# Patient Record
Sex: FEMALE | Race: WHITE | HISPANIC OR LATINO | Employment: FULL TIME | URBAN - METROPOLITAN AREA
[De-identification: names, ages, dates, MRNs, and addresses within clinical notes are randomized per-mention and may not be internally consistent; named-entity substitution may affect disease eponyms.]

---

## 2020-07-27 ENCOUNTER — TELEPHONE (OUTPATIENT)
Dept: SCHEDULING | Facility: IMAGING CENTER | Age: 50
End: 2020-07-27

## 2020-10-18 SDOH — ECONOMIC STABILITY: HOUSING INSECURITY: IN THE LAST 12 MONTHS, HOW MANY PLACES HAVE YOU LIVED?: 2

## 2020-10-18 SDOH — ECONOMIC STABILITY: INCOME INSECURITY: IN THE LAST 12 MONTHS, WAS THERE A TIME WHEN YOU WERE NOT ABLE TO PAY THE MORTGAGE OR RENT ON TIME?: NO

## 2020-10-18 SDOH — HEALTH STABILITY: PHYSICAL HEALTH: ON AVERAGE, HOW MANY DAYS PER WEEK DO YOU ENGAGE IN MODERATE TO STRENUOUS EXERCISE (LIKE A BRISK WALK)?: 3 DAYS

## 2020-10-18 SDOH — ECONOMIC STABILITY: HOUSING INSECURITY
IN THE LAST 12 MONTHS, WAS THERE A TIME WHEN YOU DID NOT HAVE A STEADY PLACE TO SLEEP OR SLEPT IN A SHELTER (INCLUDING NOW)?: NO

## 2020-10-18 SDOH — HEALTH STABILITY: MENTAL HEALTH
STRESS IS WHEN SOMEONE FEELS TENSE, NERVOUS, ANXIOUS, OR CAN'T SLEEP AT NIGHT BECAUSE THEIR MIND IS TROUBLED. HOW STRESSED ARE YOU?: ONLY A LITTLE

## 2020-10-18 SDOH — HEALTH STABILITY: PHYSICAL HEALTH

## 2020-10-18 SDOH — ECONOMIC STABILITY: TRANSPORTATION INSECURITY
IN THE PAST 12 MONTHS, HAS THE LACK OF TRANSPORTATION KEPT YOU FROM MEDICAL APPOINTMENTS OR FROM GETTING MEDICATIONS?: NO

## 2020-10-18 SDOH — ECONOMIC STABILITY: TRANSPORTATION INSECURITY
IN THE PAST 12 MONTHS, HAS LACK OF RELIABLE TRANSPORTATION KEPT YOU FROM MEDICAL APPOINTMENTS, MEETINGS, WORK OR FROM GETTING THINGS NEEDED FOR DAILY LIVING?: NO

## 2020-10-18 ASSESSMENT — SOCIAL DETERMINANTS OF HEALTH (SDOH)
HOW HARD IS IT FOR YOU TO PAY FOR THE VERY BASICS LIKE FOOD, HOUSING, MEDICAL CARE, AND HEATING?: NOT HARD AT ALL
HOW OFTEN DO YOU GET TOGETHER WITH FRIENDS OR RELATIVES?: NEVER
HOW OFTEN DO YOU HAVE SIX OR MORE DRINKS ON ONE OCCASION: NEVER
HOW OFTEN DO YOU ATTENT MEETINGS OF THE CLUB OR ORGANIZATION YOU BELONG TO?: NEVER
WITHIN THE PAST 12 MONTHS, THE FOOD YOU BOUGHT JUST DIDN'T LAST AND YOU DIDN'T HAVE MONEY TO GET MORE: NEVER TRUE
IN A TYPICAL WEEK, HOW MANY TIMES DO YOU TALK ON THE PHONE WITH FAMILY, FRIENDS, OR NEIGHBORS?: TWICE A WEEK
WITHIN THE PAST 12 MONTHS, YOU WORRIED THAT YOUR FOOD WOULD RUN OUT BEFORE YOU GOT THE MONEY TO BUY MORE: NEVER TRUE
DO YOU BELONG TO ANY CLUBS OR ORGANIZATIONS SUCH AS CHURCH GROUPS UNIONS, FRATERNAL OR ATHLETIC GROUPS, OR SCHOOL GROUPS?: NO
HOW OFTEN DO YOU ATTEND CHURCH OR RELIGIOUS SERVICES?: NEVER
HOW OFTEN DO YOU HAVE A DRINK CONTAINING ALCOHOL: 2-4 TIMES A MONTH
HOW MANY DRINKS CONTAINING ALCOHOL DO YOU HAVE ON A TYPICAL DAY WHEN YOU ARE DRINKING: 3 OR 4

## 2020-10-20 ENCOUNTER — OFFICE VISIT (OUTPATIENT)
Dept: MEDICAL GROUP | Facility: PHYSICIAN GROUP | Age: 50
End: 2020-10-20
Payer: COMMERCIAL

## 2020-10-20 VITALS
WEIGHT: 136 LBS | HEART RATE: 85 BPM | HEIGHT: 61 IN | TEMPERATURE: 98.4 F | SYSTOLIC BLOOD PRESSURE: 110 MMHG | OXYGEN SATURATION: 98 % | DIASTOLIC BLOOD PRESSURE: 74 MMHG | BODY MASS INDEX: 25.68 KG/M2 | RESPIRATION RATE: 12 BRPM

## 2020-10-20 DIAGNOSIS — E55.9 VITAMIN D DEFICIENCY: ICD-10-CM

## 2020-10-20 DIAGNOSIS — Z79.890 HORMONE REPLACEMENT THERAPY (HRT): ICD-10-CM

## 2020-10-20 DIAGNOSIS — Z12.11 SCREENING FOR COLON CANCER: ICD-10-CM

## 2020-10-20 DIAGNOSIS — Z00.00 GENERAL MEDICAL EXAM: ICD-10-CM

## 2020-10-20 DIAGNOSIS — N95.1 PERIMENOPAUSAL SYMPTOMS: ICD-10-CM

## 2020-10-20 DIAGNOSIS — Z23 NEED FOR VACCINATION: ICD-10-CM

## 2020-10-20 PROCEDURE — 90471 IMMUNIZATION ADMIN: CPT | Performed by: PHYSICIAN ASSISTANT

## 2020-10-20 PROCEDURE — 90715 TDAP VACCINE 7 YRS/> IM: CPT | Performed by: PHYSICIAN ASSISTANT

## 2020-10-20 PROCEDURE — 99203 OFFICE O/P NEW LOW 30 MIN: CPT | Mod: 25 | Performed by: PHYSICIAN ASSISTANT

## 2020-10-20 RX ORDER — CYANOCOBALAMIN (VITAMIN B-12) 1000 MCG
1 TABLET ORAL DAILY
COMMUNITY
Start: 2017-11-01

## 2020-10-20 ASSESSMENT — PATIENT HEALTH QUESTIONNAIRE - PHQ9: CLINICAL INTERPRETATION OF PHQ2 SCORE: 0

## 2020-10-20 NOTE — PROGRESS NOTES
CC:   Chief Complaint   Patient presents with   • Establish Care   • Medication Refill          HISTORY OF PRESENT ILLNESS: Patient is a 50 y.o. female established patient who presents today to establish care with me and discuss the following issues:      Health Maintenance: Completed  Will print shingrix vaccine.   Tetanus shot today.    Order cologuard. No FH colon cancer.     Hormone replacement therapy (HRT)  Was seeing a hormone doctor in [place]. Is on supplements and testosterone and progesterone therapy.     Started on this for hot flashes, has been on it for about 3 years and has helped her significantly.     Ran out of testosterone 1 1/2 weeks ago, already feeling a difference off it, would like refill.     No FH breast cancer, uterine cancer, blood clot, heart attack or stroke.       Patient Active Problem List    Diagnosis Date Noted   • Hormone replacement therapy (HRT) 10/20/2020      Allergies:Patient has no allergy information on record.    Current Outpatient Medications   Medication Sig Dispense Refill   • Ferrous Sulfate (IRON PO)      • Zoster Vac Recomb Adjuvanted (SHINGRIX) 50 MCG/0.5ML Recon Susp 0.5 mL by Intramuscular route Once for 1 dose. 0.5 mL 0   • diphenhydrAMINE HCl (ALLERGY MED PO) Take 1 Tab by mouth every day.     • Cyanocobalamin (B-12) 1000 MCG Tab Take 1 Tab by mouth every day.     • Cholecalciferol (D3-1000 PO) Take 1 Tab by mouth every day.     • TESTOSTERONE TD Apply 1 Application to affected area(s) every day.     • progesterone (PROMETRIUM) 200 MG capsule Take 1 Cap by mouth every day. 90 Cap 0     No current facility-administered medications for this visit.        Social History     Tobacco Use   • Smoking status: Never Smoker   • Smokeless tobacco: Never Used   Substance Use Topics   • Alcohol use: Yes     Frequency: 2-4 times a month     Drinks per session: 3 or 4     Binge frequency: Never   • Drug use: Never     Social History     Social History Narrative   • Not on file  "      Family History   Problem Relation Age of Onset   • Heart Disease Mother         palpitations   • Diabetes Father        Review of Systems:    Constitutional: No Fevers, Chills  Eyes: No vision changes  ENT: No hearing changes  Resp: No Shortness of breath  CV: No Chest pain  GI: No Nausea/Vomiting  MSK: No weakness  Skin: No rashes  Neuro: No Headaches  Psych: No Suicidal ideations    All remaining systems reviewed and found to be negative, except as stated above.    Exam:    /74   Pulse 85   Temp 36.9 °C (98.4 °F) (Temporal)   Resp 12   Ht 1.549 m (5' 1\")   Wt 61.7 kg (136 lb)   SpO2 98%  Body mass index is 25.7 kg/m².    General:  Well nourished, well developed female in NAD  HENT: Atraumatic, normocephalic  EYES: Extraocular movements intact  NECK: Supple, FROM  CHEST: No deformities, Equal chest expansion  RESP: Unlabored, no stridor or audible wheeze  HEART: Regular Rate and rhythm.   ABD: Soft, Nontender, Non-Distended  Extremities: No Clubbing, Cyanosis, or Edema  Skin: Warm/dry, without rashes  Neuro: A/O x 4, due to COVID-19- did not have patient remove face mask to test cranial nerves.  Motor/sensory grossly intact  Psych: Normal behavior, normal affect      Assessment/Plan:  1. Need for vaccination  - Tdap Vaccine =>6YO IM  - Zoster Vac Recomb Adjuvanted (SHINGRIX) 50 MCG/0.5ML Recon Susp; 0.5 mL by Intramuscular route Once for 1 dose.  Dispense: 0.5 mL; Refill: 0    2. Screening for colon cancer  - COLOGUARD (FIT DNA)    3. Hormone replacement therapy (HRT)  - REFERRAL TO GYNECOLOGY  Patient is on a bioidentical testosterone cream, and progesterone 200 mg daily.  Referring to Dr. Benjamin for hormone replacement therapy.  She needs a refill on her medications at this time, she is unsure of the formulation of the testosterone, she will message me to let me know I did refill progesterone today.  We will send testosterone refill to [facility] pharmacy.  4. General medical exam  - CBC " WITH DIFFERENTIAL; Future  - Comp Metabolic Panel; Future  - Lipid Profile; Future  - TSH; Future    5. Perimenopausal symptoms  - REFERRAL TO GYNECOLOGY    6. Vitamin D deficiency  - VITAMIN D,25 HYDROXY; Future    Other orders  - Ferrous Sulfate (IRON PO)  - diphenhydrAMINE HCl (ALLERGY MED PO); Take 1 Tab by mouth every day.  - Cyanocobalamin (B-12) 1000 MCG Tab; Take 1 Tab by mouth every day.  - Cholecalciferol (D3-1000 PO); Take 1 Tab by mouth every day.  - TESTOSTERONE TD; Apply 1 Application to affected area(s) every day.  - progesterone (PROMETRIUM) 200 MG capsule; Take 1 Cap by mouth every day.  Dispense: 90 Cap; Refill: 0       Follow-up: Return in about 3 months (around 1/20/2021).  Sooner pending labs.    Please note that this dictation was created using voice recognition software. I have made every reasonable attempt to correct obvious errors, but I expect that there are errors of grammar and possibly content that I did not discover before finalizing the note.

## 2020-10-20 NOTE — ASSESSMENT & PLAN NOTE
Was seeing a hormone doctor in [place]. Is on supplements and testosterone and progesterone therapy.     Started on this for hot flashes, has been on it for about 3 years and has helped her significantly.     Ran out of testosterone 1 1/2 weeks ago, already feeling a difference off it, would like refill.     No FH breast cancer, uterine cancer, blood clot, heart attack or stroke.

## 2020-10-21 NOTE — PROGRESS NOTES
Patient is on bioidentical tesosterone: 36mg/gm cream, will call in a refill prescription to [facility] pharmacy.

## 2020-10-22 ENCOUNTER — TELEPHONE (OUTPATIENT)
Dept: MEDICAL GROUP | Facility: PHYSICIAN GROUP | Age: 50
End: 2020-10-22

## 2020-10-22 DIAGNOSIS — N95.1 SYMPTOMATIC MENOPAUSAL OR FEMALE CLIMACTERIC STATES: ICD-10-CM

## 2020-10-22 RX ORDER — TESTOSTERONE MICRONIZED 100 %
POWDER (GRAM) MISCELLANEOUS
Qty: 30 G | Refills: 1 | Status: SHIPPED | OUTPATIENT
Start: 2020-10-22 | End: 2020-11-22

## 2020-10-22 NOTE — TELEPHONE ENCOUNTER
Ying Neely P.A.-C.  You 1 hour ago (8:54 AM)     Need to call [facility] pharmacy and ask how I write the presciption for this: bio identical testosterone 36 mg/gram cream.     Routing comment       Ying Neely P.A.-C. 20 hours ago (1:36 PM)        Patient is on bioidentical tesosterone: 36mg/gm cream, will call in a refill prescription to [facility] pharmacy.

## 2020-11-17 ENCOUNTER — TELEPHONE (OUTPATIENT)
Dept: MEDICAL GROUP | Facility: PHYSICIAN GROUP | Age: 50
End: 2020-11-17

## 2020-11-17 NOTE — TELEPHONE ENCOUNTER
----- Message from Ying Neely P.A.-C. sent at 11/17/2020 12:45 PM PST -----  Please call patient about their results.     Results showed: Your Cologuard test was negative which indicates a more than 99% chance you do not have colon cancer. In light of these results, a colonscopy can be put off for 3 years.    Thank you,    Ying Neely PA-C

## 2020-12-09 ENCOUNTER — HOSPITAL ENCOUNTER (OUTPATIENT)
Dept: LAB | Facility: MEDICAL CENTER | Age: 50
End: 2020-12-09
Attending: PHYSICIAN ASSISTANT
Payer: COMMERCIAL

## 2020-12-09 DIAGNOSIS — Z00.00 GENERAL MEDICAL EXAM: ICD-10-CM

## 2020-12-09 DIAGNOSIS — E55.9 VITAMIN D DEFICIENCY: ICD-10-CM

## 2020-12-09 LAB
ALBUMIN SERPL BCP-MCNC: 4.7 G/DL (ref 3.2–4.9)
ALBUMIN/GLOB SERPL: 1.5 G/DL
ALP SERPL-CCNC: 75 U/L (ref 30–99)
ALT SERPL-CCNC: 20 U/L (ref 2–50)
ANION GAP SERPL CALC-SCNC: 13 MMOL/L (ref 7–16)
AST SERPL-CCNC: 18 U/L (ref 12–45)
BASOPHILS # BLD AUTO: 0.8 % (ref 0–1.8)
BASOPHILS # BLD: 0.06 K/UL (ref 0–0.12)
BILIRUB SERPL-MCNC: 0.4 MG/DL (ref 0.1–1.5)
BUN SERPL-MCNC: 23 MG/DL (ref 8–22)
CALCIUM SERPL-MCNC: 9.8 MG/DL (ref 8.5–10.5)
CHLORIDE SERPL-SCNC: 102 MMOL/L (ref 96–112)
CHOLEST SERPL-MCNC: 248 MG/DL (ref 100–199)
CO2 SERPL-SCNC: 24 MMOL/L (ref 20–33)
CREAT SERPL-MCNC: 0.65 MG/DL (ref 0.5–1.4)
EOSINOPHIL # BLD AUTO: 0.15 K/UL (ref 0–0.51)
EOSINOPHIL NFR BLD: 2 % (ref 0–6.9)
ERYTHROCYTE [DISTWIDTH] IN BLOOD BY AUTOMATED COUNT: 44.2 FL (ref 35.9–50)
FASTING STATUS PATIENT QL REPORTED: NORMAL
GLOBULIN SER CALC-MCNC: 3.1 G/DL (ref 1.9–3.5)
GLUCOSE SERPL-MCNC: 94 MG/DL (ref 65–99)
HCT VFR BLD AUTO: 44.4 % (ref 37–47)
HDLC SERPL-MCNC: 61 MG/DL
HGB BLD-MCNC: 14.6 G/DL (ref 12–16)
IMM GRANULOCYTES # BLD AUTO: 0.03 K/UL (ref 0–0.11)
IMM GRANULOCYTES NFR BLD AUTO: 0.4 % (ref 0–0.9)
LDLC SERPL CALC-MCNC: 171 MG/DL
LYMPHOCYTES # BLD AUTO: 2.89 K/UL (ref 1–4.8)
LYMPHOCYTES NFR BLD: 38.5 % (ref 22–41)
MCH RBC QN AUTO: 30.9 PG (ref 27–33)
MCHC RBC AUTO-ENTMCNC: 32.9 G/DL (ref 33.6–35)
MCV RBC AUTO: 93.9 FL (ref 81.4–97.8)
MONOCYTES # BLD AUTO: 0.54 K/UL (ref 0–0.85)
MONOCYTES NFR BLD AUTO: 7.2 % (ref 0–13.4)
NEUTROPHILS # BLD AUTO: 3.84 K/UL (ref 2–7.15)
NEUTROPHILS NFR BLD: 51.1 % (ref 44–72)
NRBC # BLD AUTO: 0 K/UL
NRBC BLD-RTO: 0 /100 WBC
PLATELET # BLD AUTO: 296 K/UL (ref 164–446)
PMV BLD AUTO: 11.4 FL (ref 9–12.9)
POTASSIUM SERPL-SCNC: 3.9 MMOL/L (ref 3.6–5.5)
PROT SERPL-MCNC: 7.8 G/DL (ref 6–8.2)
RBC # BLD AUTO: 4.73 M/UL (ref 4.2–5.4)
SODIUM SERPL-SCNC: 139 MMOL/L (ref 135–145)
TRIGL SERPL-MCNC: 81 MG/DL (ref 0–149)
WBC # BLD AUTO: 7.5 K/UL (ref 4.8–10.8)

## 2020-12-09 PROCEDURE — 85025 COMPLETE CBC W/AUTO DIFF WBC: CPT

## 2020-12-09 PROCEDURE — 80061 LIPID PANEL: CPT

## 2020-12-09 PROCEDURE — 82306 VITAMIN D 25 HYDROXY: CPT

## 2020-12-09 PROCEDURE — 80053 COMPREHEN METABOLIC PANEL: CPT

## 2020-12-09 PROCEDURE — 84443 ASSAY THYROID STIM HORMONE: CPT

## 2020-12-09 PROCEDURE — 36415 COLL VENOUS BLD VENIPUNCTURE: CPT

## 2020-12-10 ENCOUNTER — TELEPHONE (OUTPATIENT)
Dept: MEDICAL GROUP | Facility: PHYSICIAN GROUP | Age: 50
End: 2020-12-10

## 2020-12-10 LAB
25(OH)D3 SERPL-MCNC: 69 NG/ML (ref 30–100)
TSH SERPL DL<=0.005 MIU/L-ACNC: 0.51 UIU/ML (ref 0.38–5.33)

## 2020-12-10 RX ORDER — ROSUVASTATIN CALCIUM 5 MG/1
5 TABLET, COATED ORAL EVERY EVENING
Qty: 30 TAB | Refills: 11 | Status: SHIPPED | OUTPATIENT
Start: 2020-12-10 | End: 2021-12-06 | Stop reason: SDUPTHER

## 2020-12-10 NOTE — TELEPHONE ENCOUNTER
"SMILEY Handy [facility]             Please call patient about their results.     Results showed: cholesterol is quite high. I recommend decreasing your intake of saturated fats which are found in meats that come from a cow or pig. Saturated fats are also found in creams, cheeses, butter, mayonnaise, and fried foods. I recommend that you try to eat more vegetables, fruits, fish, and healthy oils like olive oil. Increase fiber intake to 35 grams or more a day. If you do not eat a lot of fiber currently, increase fiber slowly over weeks to reduce bloating and abdominal discomfort. I also recommend moderate intensity exercise like a brisk walk. This exercise should last at least 30 minutes and occur 5 or more days per week.     Because the LDL \"bad cholesterol\" is quite elevated at 171, if patient is agreeable, I would recommend trial of cholesterol medication as well. I would recommend we try a low dose of Crestor to start. If she is ok with this I will call in the prescription.     Otherwise the labs look ok.     If you have any questions or concerns, do not hesitate to contact me or my Medical Assistant. Thank you for your time today.     Respectfully,     Ying Neely PA-C        "

## 2020-12-10 NOTE — TELEPHONE ENCOUNTER
Ying Neely P.A.-C.  Radha Dunbar, Med Ass't   Caller: Unspecified (Today, 11:06 AM)             Called in Crestor 5 mg daily, please let me know if there are any issues with the medication.     Thank you,     Ying Neely PA-C

## 2021-01-20 ENCOUNTER — GYNECOLOGY VISIT (OUTPATIENT)
Dept: OBGYN | Facility: CLINIC | Age: 51
End: 2021-01-20
Payer: COMMERCIAL

## 2021-01-20 ENCOUNTER — HOSPITAL ENCOUNTER (OUTPATIENT)
Facility: MEDICAL CENTER | Age: 51
End: 2021-01-20
Attending: OBSTETRICS & GYNECOLOGY
Payer: COMMERCIAL

## 2021-01-20 VITALS — BODY MASS INDEX: 27.21 KG/M2 | DIASTOLIC BLOOD PRESSURE: 74 MMHG | SYSTOLIC BLOOD PRESSURE: 129 MMHG | WEIGHT: 144 LBS

## 2021-01-20 DIAGNOSIS — Z12.4 CERVICAL CANCER SCREENING: ICD-10-CM

## 2021-01-20 DIAGNOSIS — Z79.890 HORMONE REPLACEMENT THERAPY (HRT): ICD-10-CM

## 2021-01-20 DIAGNOSIS — R53.83 OTHER FATIGUE: ICD-10-CM

## 2021-01-20 DIAGNOSIS — N95.9 MENOPAUSAL AND POSTMENOPAUSAL DISORDER: ICD-10-CM

## 2021-01-20 DIAGNOSIS — Z12.31 ENCOUNTER FOR SCREENING MAMMOGRAM FOR MALIGNANT NEOPLASM OF BREAST: ICD-10-CM

## 2021-01-20 DIAGNOSIS — R63.5 WEIGHT GAIN: ICD-10-CM

## 2021-01-20 DIAGNOSIS — N93.9 ABNORMAL UTERINE BLEEDING (AUB): ICD-10-CM

## 2021-01-20 PROCEDURE — 88175 CYTOPATH C/V AUTO FLUID REDO: CPT

## 2021-01-20 PROCEDURE — 99204 OFFICE O/P NEW MOD 45 MIN: CPT | Performed by: OBSTETRICS & GYNECOLOGY

## 2021-01-20 PROCEDURE — 87624 HPV HI-RISK TYP POOLED RSLT: CPT

## 2021-01-20 ASSESSMENT — FIBROSIS 4 INDEX: FIB4 SCORE: 0.68

## 2021-01-20 NOTE — PROGRESS NOTES
Subjective:      Ashley Pack is a 50 y.o. female who presents with Gynecologic Exam            HPI patient is a 50-year-old G3, P3 who presents today as a new patient with multiple gynecologic complaints.  Patient states she was getting care in [place] and moved here in October of last year.  Per report, patient is likely perimenopausal and states she has had symptoms for several years including hot flashes night sweats mood swings and she states that physician in [place] did hormone levels and told her that her testosterone  was low and she had started testosterone topical therapy for more than a year along with daily progesterone oral supplements 200 mg.  States while on testosterone therapy, her symptoms were controlled.  Patient states when she moved here in October, her new provider who is an APRN prescribe her a different dose of testosterone which is drastically lower than her previous dose and since then she believes that most of her symptoms of fatigue and tiredness started.    Patient states for the past 2 years she has had a few menstrua periods that lasted for 1 or 2 days each time and she had one episode of spotting about a week ago.  She states she has not had any recent pelvic ultrasound.    Patient would like to continue testosterone and progesterone therapy and is requesting refill of these medications.    Patient is also complaining of fatigue, about 10 pound weight gain since October, decreased energy.  She states she exercise regularly and has a home gym and she does weightbearing type exercises and also cardio exercises.  States she eats healthy.    Patient states her PCP had ordered full panel labs including thyroid function testing and the only thing abnormal was that her cholesterol is a slightly high and she is taking medication and also doing dietary modification.    Patient states she is due for Pap smear and has had Pap smear many years ago.  She reports no history of PID or  STDs      She reports history of tubal ligation    Patient also states she is due for mammogram.  She has had history of breast implants and she does breast exams sometimes.    Patient is requesting physical exam Pap smear and requests mammogram ordered today also    ROS all organ systems are reviewed and were negative except for complaints in HPI       Objective:     /74 (BP Location: Right arm, Patient Position: Sitting)   Wt 65.3 kg (144 lb)   LMP  (LMP Unknown)   Breastfeeding No   BMI 27.21 kg/m²      Physical Exam  Vitals signs and nursing note reviewed. Exam conducted with a chaperone present.   Constitutional:       General: She is not in acute distress.     Appearance: Normal appearance. She is normal weight. She is not toxic-appearing.   HENT:      Head: Normocephalic and atraumatic.   Eyes:      General:         Right eye: No discharge.         Left eye: No discharge.      Conjunctiva/sclera: Conjunctivae normal.   Neck:      Musculoskeletal: Normal range of motion and neck supple. No neck rigidity.   Cardiovascular:      Rate and Rhythm: Normal rate and regular rhythm.      Pulses: Normal pulses.      Heart sounds: Normal heart sounds. No murmur.   Pulmonary:      Effort: Pulmonary effort is normal. No respiratory distress.      Breath sounds: Normal breath sounds. No wheezing.   Chest:      Breasts:         Right: No swelling, bleeding, inverted nipple, mass, nipple discharge, skin change or tenderness.         Left: No swelling, bleeding, inverted nipple, mass, nipple discharge, skin change or tenderness.      Comments: Breast implants present  Abdominal:      General: Abdomen is flat. Bowel sounds are normal. There is no distension.      Palpations: Abdomen is soft.      Tenderness: There is no abdominal tenderness.   Genitourinary:     General: Normal vulva.      Labia:         Right: No rash, tenderness, lesion or injury.         Left: No rash, tenderness, lesion or injury.       Urethra:  No prolapse.      Vagina: No vaginal discharge, tenderness or bleeding.      Cervix: No cervical motion tenderness, friability or erythema.      Uterus: Not enlarged and not tender.       Adnexa:         Right: No mass, tenderness or fullness.          Left: No mass or fullness.        Rectum: No external hemorrhoid.   Musculoskeletal: Normal range of motion.      Right lower leg: No edema.      Left lower leg: No edema.   Lymphadenopathy:      Cervical: No cervical adenopathy.      Upper Body:      Right upper body: No supraclavicular or axillary adenopathy.      Left upper body: No supraclavicular or axillary adenopathy.      Lower Body: No right inguinal adenopathy. No left inguinal adenopathy.   Skin:     General: Skin is warm and dry.      Findings: No lesion or rash.   Neurological:      General: No focal deficit present.      Mental Status: She is alert and oriented to person, place, and time.      Gait: Gait normal.   Psychiatric:         Mood and Affect: Mood normal.         Behavior: Behavior normal.         Thought Content: Thought content normal.         Judgment: Judgment normal.                 Assessment/Plan:        1. Menopausal and postmenopausal disorder  50-year-old presenting with menopausal disorder.  We discussed menopause and post menopause.  Patient is on hormone replacement therapy and desires to continue treatment.  I counseled patient extensively on hormone replacement therapy including risk from testosterone therapy including risk for cardiac defect.  Patient desires to continue medication.  Prescription refilled for 1 year and sent to compounding pharmacy  - MA-SCREENING MAMMO BILAT W/CAD; Future    2. Hormone replacement therapy (HRT)  Patient desires to continue hormone replacement therapy.  She takes testosterone and progesterone therapy.  We discussed that topical HRT is estrogen-based but she states she was never given estrogen by her previous provider.  We discussed that we can  restart her testosterone therapy but if she continues to have symptoms, she should follow-up in 3 months to discuss starting estrogen-based therapy.  I counseled patient extensively again regarding hormone replacement therapy including risk and benefits and potential complications.  - MA-SCREENING MAMMO BILAT W/CAD; Future    3. Weight gain  We discussed diet and exercise.  Patient exercise most days of the week.  She reports elevated cholesterol which is being treated.  We discussed low-fat diet    4. Cervical cancer screening  Pap smear obtained today.  Pap smear recommendation follow-ups were discussed  - THINPREP PAP WITH HPV; Future    5. Encounter for screening mammogram for malignant neoplasm of breast  Mammogram ordered.  Self breast exam and breast cancer screening recommendations were reviewed  - MA-SCREENING MAMMO BILAT W/CAD; Future    6.  Abnormal uterine bleeding/vaginal spotting.  We discussed possible etiology.  Pelvic ultrasound ordered.    7.  Precautions and plan of care were reviewed.  Patient to follow-up in 3 months for reassessment.  25 minutes of today's visit was spent for direct face-to-face counseling regarding the above problems and treatment.

## 2021-01-20 NOTE — NON-PROVIDER
New patient here today for GYN visit.  Patient states that she is on Hormone therapy  Patient has since ran out of her testosterone   C/o gaining weight, fatigue, hot flashes  Patient needs annual today  Phone #  [phone]  Pharmacy verified.

## 2021-01-21 LAB
CYTOLOGY REG CYTOL: NORMAL
HPV HR 12 DNA CVX QL NAA+PROBE: NEGATIVE
HPV16 DNA SPEC QL NAA+PROBE: NEGATIVE
HPV18 DNA SPEC QL NAA+PROBE: NEGATIVE
SPECIMEN SOURCE: NORMAL

## 2021-01-25 DIAGNOSIS — N95.9 MENOPAUSAL AND POSTMENOPAUSAL DISORDER: ICD-10-CM

## 2021-01-25 RX ORDER — TESTOSTERONE MICRONIZED 100 %
POWDER (GRAM) MISCELLANEOUS
Qty: 45 G | Refills: 3 | Status: SHIPPED | OUTPATIENT
Start: 2021-01-25 | End: 2021-02-25

## 2021-02-01 ENCOUNTER — HOSPITAL ENCOUNTER (OUTPATIENT)
Dept: RADIOLOGY | Facility: MEDICAL CENTER | Age: 51
End: 2021-02-01
Attending: OBSTETRICS & GYNECOLOGY
Payer: COMMERCIAL

## 2021-02-01 ENCOUNTER — TELEPHONE (OUTPATIENT)
Dept: OBGYN | Facility: CLINIC | Age: 51
End: 2021-02-01

## 2021-02-01 DIAGNOSIS — N93.9 ABNORMAL UTERINE BLEEDING (AUB): ICD-10-CM

## 2021-02-01 PROCEDURE — 76830 TRANSVAGINAL US NON-OB: CPT

## 2021-02-01 NOTE — TELEPHONE ENCOUNTER
----- Message from Jr Alevs M.D. sent at 2/1/2021  2:13 PM PST -----  Pelvic ultrasound is normal except for slightly thickened lining of the uterus.  The lining of the uterus should be less than 4 mm in postmenopausal patients but hers is slightly thickened at 6 mm.  I would recommend an endometrial biopsy.  Patient can make appointment in office for endometrial biopsy.    Called pt and informed as above. Pt asking why would this be happening. Explained to pt we will not be 100% sure until Dr. Alves does the EMB. Pt agreed and verbalized understanding. Pt transferred to Nona RUANO to schedule EMB appt.

## 2021-02-02 ENCOUNTER — HOSPITAL ENCOUNTER (OUTPATIENT)
Dept: RADIOLOGY | Facility: MEDICAL CENTER | Age: 51
End: 2021-02-02
Payer: COMMERCIAL

## 2021-02-10 ENCOUNTER — TELEPHONE (OUTPATIENT)
Dept: OBGYN | Facility: CLINIC | Age: 51
End: 2021-02-10

## 2021-02-10 NOTE — TELEPHONE ENCOUNTER
February 9, 2021  Ashley Pack  to Jr Alves M.D. JR    12:33 PM  Hello,  Just following up on the compound testosterone prescription. I have not received a call from the pharmacy that a prescription has been ordered. Could you provide any updates on this?      2/10/2021 1007 Called [facility] pharmacy and spoke to pharmacist stated they are missing the strength of how much pt should be using. Explained I will consult with provider and will call back with info. Dr. Alves is out of the office today. I will consult with him tomorrow.    2/11/2021 0810 Consulted with Dr. Alves and stated pt can continue on the same dose she was taking before. Will call pharmacy.  0925 Called [facility] and spoke to latasha Richardson and confirmed pt was taking 4mg/gram. Informed Dylan Alves wants to continue with the same strength.

## 2021-02-16 ENCOUNTER — HOSPITAL ENCOUNTER (OUTPATIENT)
Dept: RADIOLOGY | Facility: MEDICAL CENTER | Age: 51
End: 2021-02-16
Attending: OBSTETRICS & GYNECOLOGY
Payer: COMMERCIAL

## 2021-02-16 DIAGNOSIS — Z12.31 ENCOUNTER FOR SCREENING MAMMOGRAM FOR MALIGNANT NEOPLASM OF BREAST: ICD-10-CM

## 2021-02-16 DIAGNOSIS — N95.9 MENOPAUSAL AND POSTMENOPAUSAL DISORDER: ICD-10-CM

## 2021-02-16 DIAGNOSIS — Z79.890 HORMONE REPLACEMENT THERAPY (HRT): ICD-10-CM

## 2021-02-16 PROCEDURE — 77063 BREAST TOMOSYNTHESIS BI: CPT

## 2021-03-02 ENCOUNTER — GYNECOLOGY VISIT (OUTPATIENT)
Dept: OBGYN | Facility: CLINIC | Age: 51
End: 2021-03-02
Payer: COMMERCIAL

## 2021-03-02 ENCOUNTER — HOSPITAL ENCOUNTER (OUTPATIENT)
Facility: MEDICAL CENTER | Age: 51
End: 2021-03-02
Attending: OBSTETRICS & GYNECOLOGY
Payer: COMMERCIAL

## 2021-03-02 VITALS — DIASTOLIC BLOOD PRESSURE: 81 MMHG | BODY MASS INDEX: 24.56 KG/M2 | WEIGHT: 130 LBS | SYSTOLIC BLOOD PRESSURE: 118 MMHG

## 2021-03-02 DIAGNOSIS — N93.9 ABNORMAL UTERINE BLEEDING (AUB): ICD-10-CM

## 2021-03-02 DIAGNOSIS — N95.9 MENOPAUSAL AND POSTMENOPAUSAL DISORDER: ICD-10-CM

## 2021-03-02 LAB
INT CON NEG: NORMAL
INT CON POS: NORMAL
PATHOLOGY CONSULT NOTE: NORMAL
POC URINE PREGNANCY TEST: NEGATIVE

## 2021-03-02 PROCEDURE — 58100 BIOPSY OF UTERUS LINING: CPT | Performed by: OBSTETRICS & GYNECOLOGY

## 2021-03-02 PROCEDURE — 88305 TISSUE EXAM BY PATHOLOGIST: CPT

## 2021-03-02 PROCEDURE — 81025 URINE PREGNANCY TEST: CPT | Performed by: OBSTETRICS & GYNECOLOGY

## 2021-03-02 ASSESSMENT — FIBROSIS 4 INDEX: FIB4 SCORE: 0.68

## 2021-03-02 NOTE — PROGRESS NOTES
Subjective:      Ashley Pack is a 50 y.o. female who presents for Procedure (EMB)            HPI patient is a 50-year-old who presents today for endometrial biopsy to evaluate abnormal uterine bleeding.  Patient has not had any bleeding since she was last seen a few months ago.  She was started back on her testosterone replacement therapy and states she felt 100% better and she feels like her normal self again.    ROS all organ systems are reviewed and are currently negative       Objective:     /81 (BP Location: Left arm, Patient Position: Sitting, BP Cuff Size: Adult)   Wt 59 kg (130 lb)   BMI 24.56 kg/m²      Physical Exam     Pelvic ultrasound results were reviewed with patient showing normal uterus and adnexa.  Endometrium was 6 mm.      Procedure:    Endometrial Biopsy Procedure:  Indications for endometrial biopsy are discussed with patient. Risks associated with biopsy are discussed with patient. Informed consent is signed.  Urine pregnancy test is noted to be negative  Patient is placed in dorsal lithotomy position a speculum was inserted into the vagina  The cervix was evaluated and cleansed with Betadine swabs x3  Tenaculum was applied to the anterior lip of the cervix.  Cervix was stenotic and os finder was used  Uterus sounded to 6 cm  Endometrial biopsy pipelle was passed through the cervical os into the endometrial cavity x 1 due to patient discomfort  Endometrial sample is obtained  Specimen sent to pathology  Instruments are removed from the vagina and cervix, hemostasis is achieved with silver nitrate  Patient tolerated the procedure well           Assessment/Plan:        1. Abnormal uterine bleeding (AUB)  Patient has not had any bleeding for several months.  Ultrasound was normal.  Endometrial biopsy obtained today.  Patient will be called with results  - POCT Pregnancy  - Consent for all Surgical, Special Diagnostic or Therapeutic Procedures    2. Menopausal and postmenopausal  disorder  Symptoms are now currently controlled with HRT.  Patient will continue HRT and we will reassess in 1 year    3.  Precautions and plan of care were reviewed

## 2021-03-02 NOTE — NON-PROVIDER
Pt here for EMB today  LMP: 1/2021  Phone: [phone]  Pharmacy verified  Consent signed   POCT HCG - negative

## 2021-03-08 ENCOUNTER — TELEPHONE (OUTPATIENT)
Dept: OBGYN | Facility: CLINIC | Age: 51
End: 2021-03-08

## 2021-03-08 NOTE — TELEPHONE ENCOUNTER
Called patient and advised her of her negative results pt understood and agreed----- Message from Jr Alves M.D. sent at 3/5/2021  9:20 AM PST -----  Please let patient know that endometrial biopsy was negative

## 2021-08-20 DIAGNOSIS — Z79.890 HORMONE REPLACEMENT THERAPY (HRT): ICD-10-CM

## 2021-08-20 RX ORDER — TESTOSTERONE CYPIONATE, MICRO 100 %
4 POWDER (GRAM) MISCELLANEOUS DAILY
Qty: 30 G | Refills: 0 | Status: SHIPPED | OUTPATIENT
Start: 2021-08-20 | End: 2021-10-19 | Stop reason: SDUPTHER

## 2021-10-19 DIAGNOSIS — Z79.890 HORMONE REPLACEMENT THERAPY (HRT): ICD-10-CM

## 2021-10-19 RX ORDER — TESTOSTERONE CYPIONATE, MICRO 100 %
4 POWDER (GRAM) MISCELLANEOUS DAILY
Qty: 30 G | Refills: 1 | Status: SHIPPED | OUTPATIENT
Start: 2021-10-19 | End: 2021-10-25 | Stop reason: SDUPTHER

## 2021-10-25 DIAGNOSIS — N95.9 MENOPAUSAL AND POSTMENOPAUSAL DISORDER: ICD-10-CM

## 2021-10-25 DIAGNOSIS — Z79.890 HORMONE REPLACEMENT THERAPY (HRT): ICD-10-CM

## 2021-10-25 RX ORDER — TESTOSTERONE CYPIONATE, MICRO 100 %
4 POWDER (GRAM) MISCELLANEOUS DAILY
Qty: 30 G | Refills: 0 | Status: CANCELLED | OUTPATIENT
Start: 2021-10-25 | End: 2022-01-25

## 2021-10-25 NOTE — TELEPHONE ENCOUNTER
----- Message from Ashley Pack sent at 10/24/2021  6:44 PM PDT -----  Regarding: Testosterone prescription   Hello. The prescription was called in to [facility] and they do not do compounding. The pharmacy that we have been using for the testosterone is [facility] compounding pharmacy in [place]. Could you confirm that this has been corrected so that I can get the prescription this week?  I appreciate your help in clarification.     10/25/21 1023 Request sent to Dr. Watts and pt informed through [facility]

## 2021-10-26 RX ORDER — TESTOSTERONE CYPIONATE, MICRO 100 %
4 POWDER (GRAM) MISCELLANEOUS DAILY
Qty: 30 G | Refills: 1 | Status: SHIPPED | OUTPATIENT
Start: 2021-10-26 | End: 2022-01-26

## 2021-11-30 ENCOUNTER — OFFICE VISIT (OUTPATIENT)
Dept: MEDICAL GROUP | Facility: PHYSICIAN GROUP | Age: 51
End: 2021-11-30
Payer: COMMERCIAL

## 2021-11-30 VITALS
BODY MASS INDEX: 27.45 KG/M2 | DIASTOLIC BLOOD PRESSURE: 70 MMHG | HEIGHT: 61 IN | SYSTOLIC BLOOD PRESSURE: 108 MMHG | HEART RATE: 80 BPM | OXYGEN SATURATION: 97 % | TEMPERATURE: 99.9 F | RESPIRATION RATE: 16 BRPM | WEIGHT: 145.4 LBS

## 2021-11-30 DIAGNOSIS — E78.5 HYPERLIPIDEMIA, UNSPECIFIED HYPERLIPIDEMIA TYPE: ICD-10-CM

## 2021-11-30 DIAGNOSIS — R63.5 WEIGHT GAIN: ICD-10-CM

## 2021-11-30 DIAGNOSIS — E78.49 OTHER HYPERLIPIDEMIA: ICD-10-CM

## 2021-11-30 DIAGNOSIS — K21.9 GASTROESOPHAGEAL REFLUX DISEASE WITHOUT ESOPHAGITIS: ICD-10-CM

## 2021-11-30 PROCEDURE — 99214 OFFICE O/P EST MOD 30 MIN: CPT | Performed by: FAMILY MEDICINE

## 2021-11-30 RX ORDER — OMEPRAZOLE 40 MG/1
40 CAPSULE, DELAYED RELEASE ORAL DAILY
Qty: 30 CAPSULE | Refills: 1 | Status: SHIPPED | OUTPATIENT
Start: 2021-11-30 | End: 2022-01-25

## 2021-11-30 ASSESSMENT — PATIENT HEALTH QUESTIONNAIRE - PHQ9: CLINICAL INTERPRETATION OF PHQ2 SCORE: 0

## 2021-11-30 ASSESSMENT — FIBROSIS 4 INDEX: FIB4 SCORE: 0.69

## 2021-12-01 NOTE — PROGRESS NOTES
Subjective:     CC:   Chief Complaint   Patient presents with   • Follow-Up       HPI:   Ashley presents today with symptoms for the last 3 to 4 months as city taste and epigastric discomfort that comes and goes.  Patient normally eats her largest meal in the evening.  Patient also has had 15 pound weight gain in the last 10 months.  Patient does have appointment with her provider on December 6 we will go ahead and order her lab work so will be available for her provider to review everything.  Patient states sometimes she has diarrhea that comes and goes no black or bloody diarrhea.    Past Medical History:   Diagnosis Date   • Gastroesophageal reflux disease without esophagitis 11/30/2021   • Hyperlipidemia        Social History     Tobacco Use   • Smoking status: Never Smoker   • Smokeless tobacco: Never Used   Vaping Use   • Vaping Use: Never used   Substance Use Topics   • Alcohol use: Yes   • Drug use: Never       Current Outpatient Medications Ordered in Epic   Medication Sig Dispense Refill   • omeprazole (PRILOSEC) 40 MG delayed-release capsule Take 1 Capsule by mouth every day. 30 Capsule 1   • Testosterone Cypionate Powder Place 4 mg on the skin every day at 6 PM for 92 days. Testosterone compounded cream 4 mg/g 30 g 1   • progesterone (PROMETRIUM) 200 MG capsule Take 1 Cap by mouth every day. 90 Cap 3   • rosuvastatin (CRESTOR) 5 MG Tab Take 1 Tab by mouth every evening. 30 Tab 11   • Ferrous Sulfate (IRON PO)      • diphenhydrAMINE HCl (ALLERGY MED PO) Take 1 Tab by mouth every day.     • Cyanocobalamin (B-12) 1000 MCG Tab Take 1 Tab by mouth every day.     • Cholecalciferol (D3-1000 PO) Take 1 Tab by mouth every day.       No current [facility]-ordered facility-administered medications on file.       Allergies:  Patient has no known allergies.    Health Maintenance: Completed    ROS:  Gen: no fevers/chills  ENT: no sore throat, no hearing loss, no bloody nose  Pulm: no sob, no cough  CV: no chest pain, no  "palpitations  Neuro: no headaches, no numbness/tingling  Heme/Lymph: no easy bruising    Objective:     Exam:  /70   Pulse 80   Temp 37.7 °C (99.9 °F)   Resp 16   Ht 1.549 m (5' 1\")   Wt 66 kg (145 lb 6.4 oz)   SpO2 97%   BMI 27.47 kg/m²  Body mass index is 27.47 kg/m².    Gen: Alert and oriented, No apparent distress.  Skin: Warm and dry.  No obvious lesions.  Eyes: Sclera wnl Pupils normal in size  Lungs: Normal effort, CTA bilaterally, no wheezes, rhonchi, or rales  CV: Regular rate and rhythm. No murmurs, rubs, or gallops.  ABD: Soft non-tender no organomegaly  Musculoskeletal: Normal gait. No extremity cyanosis, clubbing, or edema.  Neuro: Oriented to person, place and time  Psych: Mood is wnl       Labs: Fasting labs were ordered patient given a listing of all the [facility] labs    Assessment & Plan:     51 y.o. female with the following -     1. Weight gain  With her weight gain we will go ahead and order thyroid test this is a chronic problem  - TSH; Future  - TRIIDOTHYRONINE; Future    2. Hyperlipidemia, unspecified hyperlipidemia type  Patient is on Crestor will need a repeat lipid panel in order to get refills on this medication this is a chronic problem  - Comp Metabolic Panel; Future  - Lipid Profile; Future    3. Gastroesophageal reflux disease without esophagitis  We will advised patient not to eat her heaviest meal at dinner it should be at lunch instead.  Also will start her on omeprazole this is a acute problem  - CBC WITH DIFFERENTIAL; Future      Other orders  - omeprazole (PRILOSEC) 40 MG delayed-release capsule; Take 1 Capsule by mouth every day.  Dispense: 30 Capsule; Refill: 1       Return in about 1 week (around 12/7/2021).    Please note that this dictation was created using voice recognition software. I have made every reasonable attempt to correct obvious errors, but I expect that there are errors of grammar and possibly content that I did not discover before finalizing the " note.

## 2021-12-06 ENCOUNTER — OFFICE VISIT (OUTPATIENT)
Dept: MEDICAL GROUP | Facility: PHYSICIAN GROUP | Age: 51
End: 2021-12-06
Payer: COMMERCIAL

## 2021-12-06 VITALS
TEMPERATURE: 99.3 F | RESPIRATION RATE: 16 BRPM | WEIGHT: 145 LBS | DIASTOLIC BLOOD PRESSURE: 72 MMHG | BODY MASS INDEX: 27.38 KG/M2 | HEART RATE: 93 BPM | SYSTOLIC BLOOD PRESSURE: 112 MMHG | OXYGEN SATURATION: 98 % | HEIGHT: 61 IN

## 2021-12-06 DIAGNOSIS — E78.49 OTHER HYPERLIPIDEMIA: ICD-10-CM

## 2021-12-06 DIAGNOSIS — Z79.890 HORMONE REPLACEMENT THERAPY (HRT): ICD-10-CM

## 2021-12-06 DIAGNOSIS — K21.9 GASTROESOPHAGEAL REFLUX DISEASE WITHOUT ESOPHAGITIS: ICD-10-CM

## 2021-12-06 DIAGNOSIS — G43.809 OTHER MIGRAINE WITHOUT STATUS MIGRAINOSUS, NOT INTRACTABLE: ICD-10-CM

## 2021-12-06 PROBLEM — G43.909 MIGRAINE: Status: ACTIVE | Noted: 2021-12-06

## 2021-12-06 PROCEDURE — 99214 OFFICE O/P EST MOD 30 MIN: CPT | Performed by: PHYSICIAN ASSISTANT

## 2021-12-06 RX ORDER — ROSUVASTATIN CALCIUM 5 MG/1
5 TABLET, COATED ORAL EVERY EVENING
Qty: 30 TABLET | Refills: 11 | Status: SHIPPED | OUTPATIENT
Start: 2021-12-06 | End: 2022-01-06 | Stop reason: SDUPTHER

## 2021-12-06 RX ORDER — PROGESTERONE 200 MG/1
200 CAPSULE ORAL
COMMUNITY
Start: 2021-10-06 | End: 2021-12-06

## 2021-12-06 ASSESSMENT — FIBROSIS 4 INDEX: FIB4 SCORE: 0.69

## 2021-12-06 NOTE — ASSESSMENT & PLAN NOTE
"History of migraines, informed me today. History of cluster migraines as well. Hasn't have issues with cluster headaches in quite some time.   She mentioned that 2 weekends ago, while having intercourse with her - she had sudden onset headache, felt like vice on her head. She did not go to the hospital. Improved after an hour- she went to sleep. Next morning still had \"sore\" residual headache. This lasted 4 days. No headache currently resolved. No other neurological symptoms.   "

## 2021-12-06 NOTE — ASSESSMENT & PLAN NOTE
Patient is being followed by gynecology here for hormone replacement therapy.  Continues progesterone and testosterone supplementation.

## 2021-12-06 NOTE — PROGRESS NOTES
"CC:   Chief Complaint   Patient presents with   • Annual Exam   • Gastrophageal Reflux          HISTORY OF PRESENT ILLNESS: Patient is a 51 y.o. female established patient who presents today to follow up on the following conditions:       Health Maintenance: Completed  Just got covid booster- will wait for flu and shingles.     Gastroesophageal reflux disease without esophagitis  Patient continues omeprazole 40 mg daily. Patient restarted omeprazole last week. Saw Dr. Ferrell for this. In the last 4 months seems to be getting worse reflux.     Other hyperlipidemia  This is a chronic condition.   Latest Labs:   Lab Results   Component Value Date/Time    CHOLSTRLTOT 248 (H) 12/09/2020 11:41 AM     (H) 12/09/2020 11:41 AM    HDL 61 12/09/2020 11:41 AM    TRIGLYCERIDE 81 12/09/2020 11:41 AM      Medications: crestor 5 mg  Medication side effects: none    Risk calculator: The 10-year ASCVD risk score ([place] JORGENSEN Jr., et al., 2013) is: 1.1%       Hormone replacement therapy (HRT)  Patient is being followed by gynecology here for hormone replacement therapy.  Continues progesterone and testosterone supplementation.    Migraine  History of migraines, informed me today. History of cluster migraines as well. Hasn't have issues with cluster headaches in quite some time.   She mentioned that 2 weekends ago, while having intercourse with her - she had sudden onset headache, felt like vice on her head. She did not go to the hospital. Improved after an hour- she went to sleep. Next morning still had \"sore\" residual headache. This lasted 4 days. No headache currently resolved. No other neurological symptoms.       Patient Active Problem List    Diagnosis Date Noted   • Migraine 12/06/2021   • Gastroesophageal reflux disease without esophagitis 11/30/2021   • Weight gain 11/30/2021   • Other hyperlipidemia 11/30/2021   • Menopausal and postmenopausal disorder 01/20/2021   • Cervical cancer screening 01/20/2021   • Hormone " "replacement therapy (HRT) 10/20/2020      Allergies:Patient has no known allergies.    Current Outpatient Medications   Medication Sig Dispense Refill   • rosuvastatin (CRESTOR) 5 MG Tab Take 1 Tablet by mouth every evening. 30 Tablet 11   • omeprazole (PRILOSEC) 40 MG delayed-release capsule Take 1 Capsule by mouth every day. 30 Capsule 1   • Testosterone Cypionate Powder Place 4 mg on the skin every day at 6 PM for 92 days. Testosterone compounded cream 4 mg/g 30 g 1   • progesterone (PROMETRIUM) 200 MG capsule Take 1 Cap by mouth every day. 90 Cap 3   • Ferrous Sulfate (IRON PO)      • diphenhydrAMINE HCl (ALLERGY MED PO) Take 1 Tab by mouth every day.     • Cyanocobalamin (B-12) 1000 MCG Tab Take 1 Tab by mouth every day.     • Cholecalciferol (D3-1000 PO) Take 1 Tab by mouth every day.       No current facility-administered medications for this visit.       Social History     Tobacco Use   • Smoking status: Never Smoker   • Smokeless tobacco: Never Used   Vaping Use   • Vaping Use: Never used   Substance Use Topics   • Alcohol use: Yes   • Drug use: Never     Social History     Social History Narrative   • Not on file       Family History   Problem Relation Age of Onset   • Heart Disease Mother         palpitations   • Diabetes Father        Review of Systems:    Constitutional: No Fevers, Chills  Eyes: No vision changes  ENT: No hearing changes  Resp: No Shortness of breath  CV: No Chest pain  GI: No Nausea/Vomiting  MSK: No weakness  Skin: No rashes  Neuro: No Headaches  Psych: No Suicidal ideations    All remaining systems reviewed and found to be negative, except as stated above.    Exam:    /72   Pulse 93   Temp 37.4 °C (99.3 °F) (Temporal)   Resp 16   Ht 1.549 m (5' 1\")   Wt 65.8 kg (145 lb)   SpO2 98%  Body mass index is 27.4 kg/m².    General:  Well nourished, well developed female in NAD  HENT: Atraumatic, normocephalic  EYES: Extraocular movements intact  NECK: Supple, FROM  CHEST: No " deformities, Equal chest expansion  RESP: Unlabored, no stridor or audible wheeze  HEART: Regular Rate and rhythm.   Extremities: No Clubbing, Cyanosis, or Edema  Skin: Warm/dry, without rashes  Neuro: A/O x 4, due to COVID-19- did not have patient remove face mask to test cranial nerves.  Motor/sensory grossly intact  Psych: Normal behavior, normal affect      Lab review:  Labs are reviewed and discussed with a patient  Lab Results   Component Value Date/Time    CHOLSTRLTOT 248 (H) 12/09/2020 11:41 AM     (H) 12/09/2020 11:41 AM    HDL 61 12/09/2020 11:41 AM    TRIGLYCERIDE 81 12/09/2020 11:41 AM       Lab Results   Component Value Date/Time    SODIUM 139 12/09/2020 11:41 AM    POTASSIUM 3.9 12/09/2020 11:41 AM    CHLORIDE 102 12/09/2020 11:41 AM    CO2 24 12/09/2020 11:41 AM    GLUCOSE 94 12/09/2020 11:41 AM    BUN 23 (H) 12/09/2020 11:41 AM    CREATININE 0.65 12/09/2020 11:41 AM     Lab Results   Component Value Date/Time    ALKPHOSPHAT 75 12/09/2020 11:41 AM    ASTSGOT 18 12/09/2020 11:41 AM    ALTSGPT 20 12/09/2020 11:41 AM    TBILIRUBIN 0.4 12/09/2020 11:41 AM      No results found for: HBA1C  No results found for: TSH  No results found for: FREET4    Lab Results   Component Value Date/Time    WBC 7.5 12/09/2020 11:41 AM    RBC 4.73 12/09/2020 11:41 AM    HEMOGLOBIN 14.6 12/09/2020 11:41 AM    HEMATOCRIT 44.4 12/09/2020 11:41 AM    MCV 93.9 12/09/2020 11:41 AM    MCH 30.9 12/09/2020 11:41 AM    MCHC 32.9 (L) 12/09/2020 11:41 AM    MPV 11.4 12/09/2020 11:41 AM    NEUTSPOLYS 51.10 12/09/2020 11:41 AM    LYMPHOCYTES 38.50 12/09/2020 11:41 AM    MONOCYTES 7.20 12/09/2020 11:41 AM    EOSINOPHILS 2.00 12/09/2020 11:41 AM    BASOPHILS 0.80 12/09/2020 11:41 AM          Assessment/Plan:  1. Gastroesophageal reflux disease without esophagitis  - Referral to Gastroenterology  Worsening reflux, has improved with omeprazole.  Continue omeprazole 40 mg daily.  Referring to GI as well for consult.  Patient thinks she  may have intolerance to certain foods, recommend she trial dairy free and gluten-free diet and see if this improves her symptoms.  2. Other hyperlipidemia  Chronic condition, due for updated labs, continue Crestor 5 mg daily  3. Hormone replacement therapy (HRT)  Patient's last gynecologist just left, she will be calling their office to get established with a new gynecologist for hormone replacement therapy.  4. Other migraine without status migrainosus, not intractable  - CT-HEAD W/O; Future  Neurologically intact on exam today. No headache. 2 weeks ago had event during intercourse that presented as a severe headache.  She did not go to the ED at this time, had residual headache for 4 days then resolved.  Recommend CT w/o now STAT to evaluate further, r/o bleed, CVA.  Discussed with patient that if her symptoms return she needs to proceed directly to the ED for emergent evaluation.  Patient understands and agrees.    Other orders  - rosuvastatin (CRESTOR) 5 MG Tab; Take 1 Tablet by mouth every evening.  Dispense: 30 Tablet; Refill: 11       Follow-up: Return in about 6 months (around 6/6/2022) for Sooner pending labs and imaging..    Please note that this dictation was created using voice recognition software. I have made every reasonable attempt to correct obvious errors, but I expect that there are errors of grammar and possibly content that I did not discover before finalizing the note.

## 2021-12-06 NOTE — ASSESSMENT & PLAN NOTE
Patient continues omeprazole 40 mg daily. Patient restarted omeprazole last week. Saw Dr. Ferrell for this. In the last 4 months seems to be getting worse reflux.

## 2021-12-08 ENCOUNTER — HOSPITAL ENCOUNTER (OUTPATIENT)
Dept: LAB | Facility: MEDICAL CENTER | Age: 51
End: 2021-12-08
Attending: FAMILY MEDICINE
Payer: COMMERCIAL

## 2021-12-08 DIAGNOSIS — K21.9 GASTROESOPHAGEAL REFLUX DISEASE WITHOUT ESOPHAGITIS: ICD-10-CM

## 2021-12-08 DIAGNOSIS — E78.5 HYPERLIPIDEMIA, UNSPECIFIED HYPERLIPIDEMIA TYPE: ICD-10-CM

## 2021-12-08 DIAGNOSIS — R63.5 WEIGHT GAIN: ICD-10-CM

## 2021-12-08 LAB
ALBUMIN SERPL BCP-MCNC: 4.8 G/DL (ref 3.2–4.9)
ALBUMIN/GLOB SERPL: 1.5 G/DL
ALP SERPL-CCNC: 91 U/L (ref 30–99)
ALT SERPL-CCNC: 35 U/L (ref 2–50)
ANION GAP SERPL CALC-SCNC: 14 MMOL/L (ref 7–16)
AST SERPL-CCNC: 18 U/L (ref 12–45)
BASOPHILS # BLD AUTO: 0.9 % (ref 0–1.8)
BASOPHILS # BLD: 0.06 K/UL (ref 0–0.12)
BILIRUB SERPL-MCNC: 0.4 MG/DL (ref 0.1–1.5)
BUN SERPL-MCNC: 16 MG/DL (ref 8–22)
CALCIUM SERPL-MCNC: 9.8 MG/DL (ref 8.5–10.5)
CHLORIDE SERPL-SCNC: 101 MMOL/L (ref 96–112)
CHOLEST SERPL-MCNC: 184 MG/DL (ref 100–199)
CO2 SERPL-SCNC: 24 MMOL/L (ref 20–33)
CREAT SERPL-MCNC: 0.65 MG/DL (ref 0.5–1.4)
EOSINOPHIL # BLD AUTO: 0.13 K/UL (ref 0–0.51)
EOSINOPHIL NFR BLD: 2 % (ref 0–6.9)
ERYTHROCYTE [DISTWIDTH] IN BLOOD BY AUTOMATED COUNT: 42.6 FL (ref 35.9–50)
GLOBULIN SER CALC-MCNC: 3.2 G/DL (ref 1.9–3.5)
GLUCOSE SERPL-MCNC: 101 MG/DL (ref 65–99)
HCT VFR BLD AUTO: 43.4 % (ref 37–47)
HDLC SERPL-MCNC: 54 MG/DL
HGB BLD-MCNC: 14.7 G/DL (ref 12–16)
IMM GRANULOCYTES # BLD AUTO: 0.05 K/UL (ref 0–0.11)
IMM GRANULOCYTES NFR BLD AUTO: 0.8 % (ref 0–0.9)
LDLC SERPL CALC-MCNC: 111 MG/DL
LYMPHOCYTES # BLD AUTO: 2.92 K/UL (ref 1–4.8)
LYMPHOCYTES NFR BLD: 45.6 % (ref 22–41)
MCH RBC QN AUTO: 30.7 PG (ref 27–33)
MCHC RBC AUTO-ENTMCNC: 33.9 G/DL (ref 33.6–35)
MCV RBC AUTO: 90.6 FL (ref 81.4–97.8)
MONOCYTES # BLD AUTO: 0.53 K/UL (ref 0–0.85)
MONOCYTES NFR BLD AUTO: 8.3 % (ref 0–13.4)
NEUTROPHILS # BLD AUTO: 2.71 K/UL (ref 2–7.15)
NEUTROPHILS NFR BLD: 42.4 % (ref 44–72)
NRBC # BLD AUTO: 0 K/UL
NRBC BLD-RTO: 0 /100 WBC
PLATELET # BLD AUTO: 274 K/UL (ref 164–446)
PMV BLD AUTO: 11.3 FL (ref 9–12.9)
POTASSIUM SERPL-SCNC: 4.1 MMOL/L (ref 3.6–5.5)
PROT SERPL-MCNC: 8 G/DL (ref 6–8.2)
RBC # BLD AUTO: 4.79 M/UL (ref 4.2–5.4)
SODIUM SERPL-SCNC: 139 MMOL/L (ref 135–145)
T3 SERPL-MCNC: 149 NG/DL (ref 60–181)
TRIGL SERPL-MCNC: 93 MG/DL (ref 0–149)
TSH SERPL DL<=0.005 MIU/L-ACNC: 0.62 UIU/ML (ref 0.38–5.33)
WBC # BLD AUTO: 6.4 K/UL (ref 4.8–10.8)

## 2021-12-08 PROCEDURE — 85025 COMPLETE CBC W/AUTO DIFF WBC: CPT

## 2021-12-08 PROCEDURE — 80061 LIPID PANEL: CPT

## 2021-12-08 PROCEDURE — 36415 COLL VENOUS BLD VENIPUNCTURE: CPT

## 2021-12-08 PROCEDURE — 84480 ASSAY TRIIODOTHYRONINE (T3): CPT

## 2021-12-08 PROCEDURE — 80053 COMPREHEN METABOLIC PANEL: CPT

## 2021-12-08 PROCEDURE — 84443 ASSAY THYROID STIM HORMONE: CPT

## 2021-12-15 ENCOUNTER — TELEPHONE (OUTPATIENT)
Dept: MEDICAL GROUP | Facility: PHYSICIAN GROUP | Age: 51
End: 2021-12-15

## 2021-12-16 NOTE — TELEPHONE ENCOUNTER
1. Caller Name: Ashley Pack                          Call Back Number: [phone] (home)         How would the patient prefer to be contacted with a response:     2. Patient is requesting lab results dated: 12/08/21    3. Confirmed results are in chart. Patient advised they will be contacted once interpreted by provider.    Pt states she is still getting headaches but not as bad

## 2021-12-17 NOTE — TELEPHONE ENCOUNTER
Phone Number Called: [phone] (home)       Call outcome: Did not leave a detailed message. Requested patient to call back.    Message: 1st attempt

## 2021-12-27 ENCOUNTER — TELEMEDICINE (OUTPATIENT)
Dept: MEDICAL GROUP | Facility: PHYSICIAN GROUP | Age: 51
End: 2021-12-27
Payer: COMMERCIAL

## 2021-12-27 ENCOUNTER — HOSPITAL ENCOUNTER (OUTPATIENT)
Facility: MEDICAL CENTER | Age: 51
End: 2021-12-27
Attending: FAMILY MEDICINE
Payer: COMMERCIAL

## 2021-12-27 VITALS — BODY MASS INDEX: 25.3 KG/M2 | WEIGHT: 134 LBS | HEIGHT: 61 IN

## 2021-12-27 DIAGNOSIS — R05.9 COUGH: ICD-10-CM

## 2021-12-27 DIAGNOSIS — R09.81 SINUS CONGESTION: ICD-10-CM

## 2021-12-27 LAB
EXTERNAL QUALITY CONTROL: NORMAL
SARS-COV+SARS-COV-2 AG RESP QL IA.RAPID: NEGATIVE

## 2021-12-27 PROCEDURE — 87426 SARSCOV CORONAVIRUS AG IA: CPT | Performed by: FAMILY MEDICINE

## 2021-12-27 PROCEDURE — U0003 INFECTIOUS AGENT DETECTION BY NUCLEIC ACID (DNA OR RNA); SEVERE ACUTE RESPIRATORY SYNDROME CORONAVIRUS 2 (SARS-COV-2) (CORONAVIRUS DISEASE [COVID-19]), AMPLIFIED PROBE TECHNIQUE, MAKING USE OF HIGH THROUGHPUT TECHNOLOGIES AS DESCRIBED BY CMS-2020-01-R: HCPCS

## 2021-12-27 PROCEDURE — U0005 INFEC AGEN DETEC AMPLI PROBE: HCPCS

## 2021-12-27 PROCEDURE — 99213 OFFICE O/P EST LOW 20 MIN: CPT | Performed by: FAMILY MEDICINE

## 2021-12-27 RX ORDER — BENZONATATE 100 MG/1
100 CAPSULE ORAL 3 TIMES DAILY PRN
Qty: 12 CAPSULE | Refills: 0 | Status: SHIPPED | OUTPATIENT
Start: 2021-12-27 | End: 2021-12-31

## 2021-12-27 RX ORDER — AZITHROMYCIN 250 MG/1
TABLET, FILM COATED ORAL
Qty: 6 TABLET | Refills: 0 | Status: SHIPPED | OUTPATIENT
Start: 2021-12-27 | End: 2021-12-31

## 2021-12-27 ASSESSMENT — FIBROSIS 4 INDEX: FIB4 SCORE: 0.57

## 2021-12-27 NOTE — PROGRESS NOTES
Virtual Visit: Established Patient   This visit was conducted via Zoom using secure and encrypted videoconferencing technology.   The patient was in a private location in the state of [place].    The patient's identity was confirmed and verbal consent was obtained for this virtual visit.    Subjective:   CC:   Chief Complaint   Patient presents with   • Follow-Up       Ashley Pack is a 51 y.o. female complaining of congestion and drainage now she is coughing up clear to a little yellow.  Patient denies any fever or loss of taste or smell.  Patient's been sick since Saturday.  Patient has gotten her Covid booster December 2 in 1 week ago she had shingles plus the flu vaccination.      ROS       Current medicines (including changes today)  Current Outpatient Medications   Medication Sig Dispense Refill   • azithromycin (ZITHROMAX) 250 MG Tab 2 p.o. today then 1 each day for the next 4 more days 6 Tablet 0   • benzonatate (TESSALON) 100 MG Cap Take 1 Capsule by mouth 3 times a day as needed for Cough for up to 4 days. 12 Capsule 0   • rosuvastatin (CRESTOR) 5 MG Tab Take 1 Tablet by mouth every evening. 30 Tablet 11   • omeprazole (PRILOSEC) 40 MG delayed-release capsule Take 1 Capsule by mouth every day. 30 Capsule 1   • Testosterone Cypionate Powder Place 4 mg on the skin every day at 6 PM for 92 days. Testosterone compounded cream 4 mg/g 30 g 1   • progesterone (PROMETRIUM) 200 MG capsule Take 1 Cap by mouth every day. 90 Cap 3   • Ferrous Sulfate (IRON PO)      • diphenhydrAMINE HCl (ALLERGY MED PO) Take 1 Tab by mouth every day.     • Cyanocobalamin (B-12) 1000 MCG Tab Take 1 Tab by mouth every day.     • Cholecalciferol (D3-1000 PO) Take 1 Tab by mouth every day.       No current facility-administered medications for this visit.       Patient Active Problem List    Diagnosis Date Noted   • Sinus congestion 12/27/2021   • Cough 12/27/2021   • Migraine 12/06/2021   • Gastroesophageal reflux disease without  "esophagitis 11/30/2021   • Weight gain 11/30/2021   • Other hyperlipidemia 11/30/2021   • Menopausal and postmenopausal disorder 01/20/2021   • Cervical cancer screening 01/20/2021   • Hormone replacement therapy (HRT) 10/20/2020        Objective:   Ht 1.549 m (5' 1\") Comment: Pt states  Wt 60.8 kg (134 lb) Comment: Pt states  BMI 25.32 kg/m²     Physical Exam:  Constitutional: Alert, no distress, well-groomed.  Skin: No rashes in visible areas.  Eye: Round. Conjunctiva clear, lids normal.  ENMT: Lips pink without lesions. Phonation normal.  Neck: No masses, no thyromegaly. Moves freely without pain.  Respiratory: Unlabored respiratory effort, no cough or audible wheeze  Psych: Alert and oriented x3, normal affect and mood.     Assessment and Plan:   The following treatment plan was discussed:     1. Sinus congestion  Since moving from [place] patient has had a lot more problems with her allergies we will go ahead and write referral to ear nose and throat.  Patient is using Nasacort regularly plus over-the-counter decongestant.  This is a chronic problem.   2. Cough  We will go ahead and start her on antibiotics but also will get a rapid Covid test.  I did recommend if the rapid is negative she should quarantine until we get the formal test back.  Patient also is asking for a cough suppressant she has been on NyQuil and DayQuil recommend she stop using both.  We will just put her on Tessalon Perles this is a acute problem.  Her Covid test was negative the rapid will send for formal testing.  Patient should stay quarantine for the next 2 to 3 days until we get the results.      Follow-up: Return if symptoms worsen or fail to improve.         "

## 2021-12-28 DIAGNOSIS — R05.9 COUGH: ICD-10-CM

## 2021-12-28 LAB
COVID ORDER STATUS COVID19: NORMAL
SARS-COV-2 RNA RESP QL NAA+PROBE: NOTDETECTED
SPECIMEN SOURCE: NORMAL

## 2022-01-07 RX ORDER — ROSUVASTATIN CALCIUM 5 MG/1
5 TABLET, COATED ORAL EVERY EVENING
Qty: 90 TABLET | Refills: 3 | Status: SHIPPED | OUTPATIENT
Start: 2022-01-07 | End: 2022-12-28 | Stop reason: SDUPTHER

## 2022-01-07 NOTE — TELEPHONE ENCOUNTER
Patient requests change in pharmacy.     [facility] - [place], [place] - [address] AT [facility]  [address]  [facility] [address]  Phone: [phone] Fax: [phone]

## 2022-01-25 ENCOUNTER — GYNECOLOGY VISIT (OUTPATIENT)
Dept: OBGYN | Facility: CLINIC | Age: 52
End: 2022-01-25
Payer: COMMERCIAL

## 2022-01-25 VITALS — WEIGHT: 145 LBS | BODY MASS INDEX: 27.4 KG/M2 | SYSTOLIC BLOOD PRESSURE: 130 MMHG | DIASTOLIC BLOOD PRESSURE: 75 MMHG

## 2022-01-25 DIAGNOSIS — Z01.419 WOMEN'S ANNUAL ROUTINE GYNECOLOGICAL EXAMINATION: ICD-10-CM

## 2022-01-25 PROCEDURE — 99396 PREV VISIT EST AGE 40-64: CPT | Performed by: OBSTETRICS & GYNECOLOGY

## 2022-01-25 RX ORDER — PROGESTERONE 200 MG/1
200 CAPSULE ORAL DAILY
Qty: 30 CAPSULE | Refills: 5 | Status: SHIPPED | OUTPATIENT
Start: 2022-01-25 | End: 2022-07-26

## 2022-01-25 RX ORDER — OMEPRAZOLE 40 MG/1
40 CAPSULE, DELAYED RELEASE ORAL DAILY
Qty: 30 CAPSULE | Refills: 1 | Status: SHIPPED | OUTPATIENT
Start: 2022-01-25 | End: 2022-03-29

## 2022-01-25 ASSESSMENT — FIBROSIS 4 INDEX: FIB4 SCORE: 0.57

## 2022-01-25 NOTE — PROGRESS NOTES
Annual examination; previous patient of   This patient is a 51 y.o. female  postmenopausal no menstrual cycles or postmenopausal bleeding but complains of weight gain currently on testosterone (topical) 4 mg/g 4 clicks daily, progesterone 200 mg p.o. daily.  The patient states that she is taking these hormones because they were initially prescribed by her physician in [place], [place] and Dr. Basilio continued to write for these.  Patient is not currently on estrogen replacement therapy and does not requested.  Patient states that she has bilateral knee pain and has increased stair climbing due to her different job that she is currently on.  Patient states that she currently takes some medication for her knee pain which provide some relief.    Last Pap smear 1 year ago normal    Last mammogram-1 year ago-normal    /75 (BP Location: Right arm, Patient Position: Sitting)   Wt 65.8 kg (145 lb)   BMI 27.40 kg/m²     Physical examination;  Breast examination- No dominant masses, No skin retraction, No axillary adenopathy-bilateral breast implants intact    Pelvic examination;  External genitalia-No visible lesions, urethra normal in appearance, [place]'s glands normal  Vagina-No blood or discharge, bladder normal in position without gross lesions  Cervix-No gross lesions, Pap smear taken  Uterus-Normal size and shape,  No tenderness  Adnexa No mass or tenderness    Abdomen-nondistended positive bowel sounds soft nontender without masses or hepatosplenomegaly    Impression;  Normal annual    Plan;  Reproductive endocrinology referral placed  Patient is to follow-up with primary care provider due to bilateral knee pain and also due to increased weight gain likely metabolic syndrome-needs diabetes work-up  Start Mammogram age 40 yrs.    Female chaperone present during entire history and physical examination

## 2022-04-18 ENCOUNTER — HOSPITAL ENCOUNTER (OUTPATIENT)
Dept: RADIOLOGY | Facility: MEDICAL CENTER | Age: 52
End: 2022-04-18
Attending: OBSTETRICS & GYNECOLOGY
Payer: COMMERCIAL

## 2022-04-18 DIAGNOSIS — Z01.419 WOMEN'S ANNUAL ROUTINE GYNECOLOGICAL EXAMINATION: ICD-10-CM

## 2022-04-18 PROCEDURE — 77063 BREAST TOMOSYNTHESIS BI: CPT

## 2022-05-24 ENCOUNTER — OFFICE VISIT (OUTPATIENT)
Dept: MEDICAL GROUP | Facility: PHYSICIAN GROUP | Age: 52
End: 2022-05-24
Payer: COMMERCIAL

## 2022-05-24 VITALS
SYSTOLIC BLOOD PRESSURE: 118 MMHG | DIASTOLIC BLOOD PRESSURE: 76 MMHG | BODY MASS INDEX: 27.34 KG/M2 | HEART RATE: 80 BPM | TEMPERATURE: 99.4 F | WEIGHT: 144.8 LBS | OXYGEN SATURATION: 99 % | RESPIRATION RATE: 16 BRPM | HEIGHT: 61 IN

## 2022-05-24 DIAGNOSIS — L98.9 LESION OF FACE: ICD-10-CM

## 2022-05-24 DIAGNOSIS — R63.5 WEIGHT GAIN: ICD-10-CM

## 2022-05-24 PROCEDURE — 99213 OFFICE O/P EST LOW 20 MIN: CPT | Performed by: FAMILY MEDICINE

## 2022-05-24 ASSESSMENT — PATIENT HEALTH QUESTIONNAIRE - PHQ9: CLINICAL INTERPRETATION OF PHQ2 SCORE: 0

## 2022-05-24 ASSESSMENT — FIBROSIS 4 INDEX: FIB4 SCORE: 0.57

## 2022-05-24 NOTE — PROGRESS NOTES
Subjective:     CC:   Chief Complaint   Patient presents with   • Follow-Up       HPI:   Ashley presents today with a concern of a lesion on her face its been there for a couple months and it slowly increasing in size.  Patient does have some skin tags on her neck also that she finds that her necklace gets caught on.  Patient is going to increase her activity level is planning on using a treadmill and also a cycle during the summertime.    Past Medical History:   Diagnosis Date   • Gastroesophageal reflux disease without esophagitis 11/30/2021   • Hyperlipidemia    • Migraine 12/6/2021       Social History     Tobacco Use   • Smoking status: Never Smoker   • Smokeless tobacco: Never Used   Vaping Use   • Vaping Use: Never used   Substance Use Topics   • Alcohol use: Yes     Comment: Occ   • Drug use: Never       Current Outpatient Medications Ordered in Epic   Medication Sig Dispense Refill   • omeprazole (PRILOSEC) 40 MG delayed-release capsule TAKE 1 CAPSULE BY MOUTH EVERY DAY 30 Capsule 2   • progesterone (PROMETRIUM) 200 MG capsule Take 1 Capsule by mouth every day. 30 Capsule 5   • rosuvastatin (CRESTOR) 5 MG Tab Take 1 Tablet by mouth every evening. 90 Tablet 3   • progesterone (PROMETRIUM) 200 MG capsule Take 1 Cap by mouth every day. 90 Cap 3   • Ferrous Sulfate (IRON PO)      • diphenhydrAMINE HCl (ALLERGY MED PO) Take 1 Tab by mouth every day.     • Cyanocobalamin (B-12) 1000 MCG Tab Take 1 Tab by mouth every day.     • Cholecalciferol (D3-1000 PO) Take 1 Tab by mouth every day.       No current [facility]-ordered facility-administered medications on file.       Allergies:  Patient has no known allergies.    Health Maintenance: Completed    ROS:  Gen: no fevers/chills, has gained weight since of last seen her  Eyes: no changes in vision  ENT: no sore throat, no hearing loss, no bloody nose  Pulm: no sob, no cough  CV: no chest pain, no palpitations  GI: no nausea/vomiting, no diarrhea  Neuro: no headaches, no  "numbness/tingling  Heme/Lymph: no easy bruising    Objective:     Exam:  /76 (BP Location: Left arm, Patient Position: Sitting, BP Cuff Size: Adult)   Pulse 80   Temp 37.4 °C (99.4 °F) (Temporal)   Resp 16   Ht 1.549 m (5' 1\")   Wt 65.7 kg (144 lb 12.8 oz)   SpO2 99%   BMI 27.36 kg/m²  Body mass index is 27.36 kg/m².    Gen: Alert and oriented, No apparent distress.  Skin: Warm and dry.  Patient has a darkened circular raised lesion about 4 mm in size noted on her right side of her face in the jaw area.  Patient does have some darkening skin tags noted around her neck.  Eyes: Sclera wnl Pupils normal in size  Lungs: Normal effort, CTA bilaterally, no wheezes, rhonchi, or rales  CV: Regular rate and rhythm. No murmurs, rubs, or gallops.  Musculoskeletal: Normal gait. No extremity cyanosis, clubbing, or edema.  Neuro: Oriented to person, place and time  Psych: Mood is wnl       Assessment & Plan:     51 y.o. female with the following -     1. Lesion of face  The darkened lesion on her face we will send a urgent dermatology referral to this is a acute problem.  I will write a referral to [facility] scheduling if for some reason skin to be taking longer she can always contact one of the external dermatology clinics and if they need referral patient to let me know  2. Weight gain  Would like to see patient back in a couple months to see if increase in activity level is helping with her avoiding any further weight gain and hopefully some weight loss this is a chronic problem       Return in about 2 months (around 7/24/2022).    Please note that this dictation was created using voice recognition software. I have made every reasonable attempt to correct obvious errors, but I expect that there are errors of grammar and possibly content that I did not discover before finalizing the note.  "

## 2022-06-28 ENCOUNTER — HOSPITAL ENCOUNTER (OUTPATIENT)
Dept: LAB | Facility: MEDICAL CENTER | Age: 52
End: 2022-06-28
Attending: OBSTETRICS & GYNECOLOGY
Payer: COMMERCIAL

## 2022-06-28 LAB
ESTRADIOL SERPL-MCNC: 73.7 PG/ML
PROLACTIN SERPL-MCNC: 16.6 NG/ML (ref 2.8–26)
TESTOST SERPL-MCNC: 24 NG/DL (ref 9–75)

## 2022-06-28 PROCEDURE — 83520 IMMUNOASSAY QUANT NOS NONAB: CPT

## 2022-06-28 PROCEDURE — 36415 COLL VENOUS BLD VENIPUNCTURE: CPT

## 2022-06-28 PROCEDURE — 82951 GLUCOSE TOLERANCE TEST (GTT): CPT

## 2022-06-28 PROCEDURE — 82670 ASSAY OF TOTAL ESTRADIOL: CPT

## 2022-06-28 PROCEDURE — 84443 ASSAY THYROID STIM HORMONE: CPT

## 2022-06-28 PROCEDURE — 84146 ASSAY OF PROLACTIN: CPT

## 2022-06-28 PROCEDURE — 83525 ASSAY OF INSULIN: CPT

## 2022-06-28 PROCEDURE — 84403 ASSAY OF TOTAL TESTOSTERONE: CPT

## 2022-06-29 LAB — TSH SERPL DL<=0.005 MIU/L-ACNC: 0.77 UIU/ML (ref 0.38–5.33)

## 2022-06-30 LAB
GLUCOSE 1H P CHAL SERPL-MCNC: 217 MG/DL (ref 65–199)
GLUCOSE 2H P CHAL SERPL-MCNC: 145 MG/DL (ref 65–139)
GLUCOSE BS SERPL-MCNC: 110 MG/DL (ref 65–99)
INSULIN 1H P CHAL SERPL-ACNC: 141 UIU/ML (ref 29–88)
INSULIN 2H P CHAL SERPL-ACNC: 312 UIU/ML (ref 22–79)
INSULIN P FAST SERPL-ACNC: 16 UIU/ML (ref 3–25)

## 2022-07-01 LAB — MIS SERPL-MCNC: 0.01 NG/ML

## 2022-07-26 RX ORDER — PROGESTERONE 200 MG/1
200 CAPSULE ORAL DAILY
Qty: 30 CAPSULE | Refills: 5 | Status: SHIPPED | OUTPATIENT
Start: 2022-07-26

## 2022-10-21 ENCOUNTER — HOSPITAL ENCOUNTER (OUTPATIENT)
Dept: LAB | Facility: MEDICAL CENTER | Age: 52
End: 2022-10-21
Attending: INTERNAL MEDICINE
Payer: COMMERCIAL

## 2022-10-21 LAB
ALBUMIN SERPL BCP-MCNC: 4.6 G/DL (ref 3.2–4.9)
ALBUMIN/GLOB SERPL: 1.6 G/DL
ALP SERPL-CCNC: 77 U/L (ref 30–99)
ALT SERPL-CCNC: 48 U/L (ref 2–50)
ANION GAP SERPL CALC-SCNC: 13 MMOL/L (ref 7–16)
AST SERPL-CCNC: 26 U/L (ref 12–45)
BILIRUB SERPL-MCNC: 0.4 MG/DL (ref 0.1–1.5)
BUN SERPL-MCNC: 19 MG/DL (ref 8–22)
CALCIUM SERPL-MCNC: 9.5 MG/DL (ref 8.5–10.5)
CHLORIDE SERPL-SCNC: 100 MMOL/L (ref 96–112)
CHOLEST SERPL-MCNC: 177 MG/DL (ref 100–199)
CO2 SERPL-SCNC: 25 MMOL/L (ref 20–33)
CORTIS SERPL-MCNC: 12.3 UG/DL (ref 0–23)
CREAT SERPL-MCNC: 0.66 MG/DL (ref 0.5–1.4)
CREAT UR-MCNC: 175.31 MG/DL
FASTING STATUS PATIENT QL REPORTED: NORMAL
FERRITIN SERPL-MCNC: 435 NG/ML (ref 10–291)
FSH SERPL-ACNC: 36.6 MIU/ML
GFR SERPLBLD CREATININE-BSD FMLA CKD-EPI: 105 ML/MIN/1.73 M 2
GLOBULIN SER CALC-MCNC: 2.8 G/DL (ref 1.9–3.5)
GLUCOSE SERPL-MCNC: 95 MG/DL (ref 65–99)
HDLC SERPL-MCNC: 47 MG/DL
LDLC SERPL CALC-MCNC: 113 MG/DL
LH SERPL-ACNC: 18 IU/L
POTASSIUM SERPL-SCNC: 4 MMOL/L (ref 3.6–5.5)
POTASSIUM UR-SCNC: 69 MMOL/L
PROT SERPL-MCNC: 7.4 G/DL (ref 6–8.2)
PTH-INTACT SERPL-MCNC: 33.3 PG/ML (ref 14–72)
SODIUM SERPL-SCNC: 138 MMOL/L (ref 135–145)
SODIUM UR-SCNC: 110 MMOL/L
TRIGL SERPL-MCNC: 87 MG/DL (ref 0–149)
TSH SERPL DL<=0.005 MIU/L-ACNC: 1.01 UIU/ML (ref 0.38–5.33)

## 2022-10-21 PROCEDURE — 84300 ASSAY OF URINE SODIUM: CPT

## 2022-10-21 PROCEDURE — 82340 ASSAY OF CALCIUM IN URINE: CPT

## 2022-10-21 PROCEDURE — 36415 COLL VENOUS BLD VENIPUNCTURE: CPT

## 2022-10-21 PROCEDURE — 83945 ASSAY OF OXALATE: CPT

## 2022-10-21 PROCEDURE — 82088 ASSAY OF ALDOSTERONE: CPT

## 2022-10-21 PROCEDURE — 84133 ASSAY OF URINE POTASSIUM: CPT

## 2022-10-21 PROCEDURE — 84305 ASSAY OF SOMATOMEDIN: CPT

## 2022-10-21 PROCEDURE — 80061 LIPID PANEL: CPT

## 2022-10-21 PROCEDURE — 83002 ASSAY OF GONADOTROPIN (LH): CPT

## 2022-10-21 PROCEDURE — 84105 ASSAY OF URINE PHOSPHORUS: CPT

## 2022-10-21 PROCEDURE — 83970 ASSAY OF PARATHORMONE: CPT

## 2022-10-21 PROCEDURE — 82728 ASSAY OF FERRITIN: CPT

## 2022-10-21 PROCEDURE — 82533 TOTAL CORTISOL: CPT

## 2022-10-21 PROCEDURE — 80053 COMPREHEN METABOLIC PANEL: CPT

## 2022-10-21 PROCEDURE — 84443 ASSAY THYROID STIM HORMONE: CPT

## 2022-10-21 PROCEDURE — 82570 ASSAY OF URINE CREATININE: CPT

## 2022-10-21 PROCEDURE — 83001 ASSAY OF GONADOTROPIN (FSH): CPT

## 2022-10-25 LAB
ALDOST SERPL-MCNC: 10.5 NG/DL
CALCIUM 24H UR-MCNC: 15.7 MG/DL
CALCIUM 24H UR-MRATE: NORMAL MG/D (ref 100–250)
CALCIUM/CREAT 24H UR: 95 MG/G (ref 20–300)
COLLECT DURATION TIME SPEC: NORMAL H
COLLECT DURATION TIME SPEC: NORMAL HRS
COLLECT DURATION TIME SPEC: NORMAL HRS
CREAT 24H UR-MCNC: 165 MG/DL
CREAT 24H UR-MCNC: 169 MG/DL
CREAT 24H UR-MRATE: NORMAL MG/D (ref 500–1400)
IGF-I SERPL-MCNC: 156 NG/ML (ref 53–234)
IGF-I Z-SCORE SERPL: 0.7
OXALATE 24H UR-MCNC: 36 MG/L
OXALATE 24H UR-MRATE: NORMAL MG/D (ref 13–40)
PHOSPHATE 24H UR-MCNC: 16 MG/DL
PHOSPHATE 24H UR-MRATE: NORMAL MG/D (ref 400–1300)
PHOSPHATE/CREAT 24H UR: 97 MG/G
SPECIMEN VOL ?TM UR: NORMAL ML
TOTAL VOLUME 1105: NORMAL ML
TOTAL VOLUME 1105: NORMAL ML

## 2022-12-01 ENCOUNTER — OFFICE VISIT (OUTPATIENT)
Dept: URGENT CARE | Facility: PHYSICIAN GROUP | Age: 52
End: 2022-12-01
Payer: COMMERCIAL

## 2022-12-01 VITALS
SYSTOLIC BLOOD PRESSURE: 118 MMHG | HEIGHT: 61 IN | DIASTOLIC BLOOD PRESSURE: 64 MMHG | RESPIRATION RATE: 18 BRPM | BODY MASS INDEX: 23.41 KG/M2 | HEART RATE: 78 BPM | OXYGEN SATURATION: 99 % | WEIGHT: 124 LBS | TEMPERATURE: 98.8 F

## 2022-12-01 DIAGNOSIS — R42 VERTIGO: ICD-10-CM

## 2022-12-01 PROCEDURE — 99214 OFFICE O/P EST MOD 30 MIN: CPT | Performed by: NURSE PRACTITIONER

## 2022-12-01 RX ORDER — CALCITONIN SALMON 200 [IU]/.09ML
SPRAY, METERED NASAL
COMMUNITY
Start: 2022-10-31

## 2022-12-01 RX ORDER — MECLIZINE HYDROCHLORIDE 25 MG/1
25 TABLET ORAL 3 TIMES DAILY PRN
Qty: 30 TABLET | Refills: 0 | Status: SHIPPED | OUTPATIENT
Start: 2022-12-01

## 2022-12-01 ASSESSMENT — VISUAL ACUITY: OU: 1

## 2022-12-01 ASSESSMENT — FIBROSIS 4 INDEX: FIB4 SCORE: 0.71

## 2022-12-01 NOTE — PROGRESS NOTES
Ashley Pack is a 52 y.o. female who presents for Dizziness (X 1 day, feeling off balance, feels like everything is moving)    Accompanied by her daughter   HPI  This is a new problem. Ashley Pack is a 52 y.o. patient who presents to urgent care with c/o: woke up feeling well today at 5:30 am.  Then went to lay back down and turned over in her bed to grab her phone off her bedstand and then back over to her other side. She had sudden onset of dizziness with the room scrolling left to right in her vision. She felt bad even if she closed her eyes. She layed still for awhile and then got up and ate breakfast.   Then when she was in the shower shaving her legs she felt onset of dizziness and had to hold onto shower door. Then felt better and was blow drying her head leaning over. She once again felt dizzy. Now she feels like the ground is unstable. If she turns her head quickly she gets nauseated again. Treatments tried: sudafed   She usually does not get sea sick even on a boat. No recent illness or nasal drainage.   Denies, c/o, sob , tinnitus, Denies recent trauma or unusual activity.    Hx of migraines, and hyperlipidemia   Denies headache, staggering or ataxic gait, headache, double vision, visual loss, slurred speech, numbness of the face or body, weakness, clumsiness, or incoordination.   No other aggravating or alleviating factors.       ROS See HPI    Allergies:     No Known Allergies    PMSFS Hx:  Past Medical History:   Diagnosis Date    Gastroesophageal reflux disease without esophagitis 11/30/2021    Hyperlipidemia     Migraine 12/6/2021     Past Surgical History:   Procedure Laterality Date    BREAST RECONSTRUCTION      CARPAL TUNNEL RELEASE Right     TUBAL COAGULATION LAPAROSCOPIC BILATERAL       Family History   Problem Relation Age of Onset    Heart Disease Mother         palpitations    Diabetes Father      Social History     Tobacco Use    Smoking status: Never    Smokeless tobacco: Never  "  Substance Use Topics    Alcohol use: Yes     Comment: Occ       Problems:   Patient Active Problem List   Diagnosis    Hormone replacement therapy (HRT)    Menopausal and postmenopausal disorder    Cervical cancer screening    Gastroesophageal reflux disease without esophagitis    Weight gain    Other hyperlipidemia    Migraine    Sinus congestion    Cough    Lesion of face       Medications:   Current Outpatient Medications on File Prior to Visit   Medication Sig Dispense Refill    calcitonin, salmon, (MIACALCIN) 200 UNIT/ACT Solution USE ONE SPRAY IN ONE NOSTRIL NIGHTLY      progesterone (PROMETRIUM) 200 MG capsule TAKE 1 CAPSULE BY MOUTH EVERY DAY 30 Capsule 5    omeprazole (PRILOSEC) 40 MG delayed-release capsule TAKE 1 CAPSULE BY MOUTH EVERY DAY 30 Capsule 2    rosuvastatin (CRESTOR) 5 MG Tab Take 1 Tablet by mouth every evening. 90 Tablet 3    progesterone (PROMETRIUM) 200 MG capsule Take 1 Cap by mouth every day. 90 Cap 3    Ferrous Sulfate (IRON PO)       diphenhydrAMINE HCl (ALLERGY MED PO) Take 1 Tab by mouth every day.      Cyanocobalamin (B-12) 1000 MCG Tab Take 1 Tab by mouth every day.      Cholecalciferol (D3-1000 PO) Take 1 Tab by mouth every day.       No current facility-administered medications on file prior to visit.          Objective:     /64 (BP Location: Left arm, Patient Position: Sitting, BP Cuff Size: Adult)   Pulse 78   Temp 37.1 °C (98.8 °F) (Temporal)   Resp 18   Ht 1.549 m (5' 1\")   Wt 56.2 kg (124 lb)   SpO2 99%   BMI 23.43 kg/m²     Physical Exam  Vitals and nursing note reviewed.   Constitutional:       General: She is not in acute distress.     Appearance: Normal appearance. She is well-developed. She is not toxic-appearing.   HENT:      Head: Normocephalic and atraumatic.      Right Ear: Hearing, tympanic membrane, ear canal and external ear normal. Tympanic membrane is not injected or erythematous.      Left Ear: Hearing, tympanic membrane, ear canal and external " ear normal. Tympanic membrane is not injected or erythematous.      Nose: Nose normal. No mucosal edema or rhinorrhea.      Right Sinus: No maxillary sinus tenderness or frontal sinus tenderness.      Left Sinus: No maxillary sinus tenderness or frontal sinus tenderness.      Mouth/Throat:      Mouth: Mucous membranes are moist.      Pharynx: Uvula midline.   Eyes:      General: Lids are normal. Vision grossly intact.      Extraocular Movements:      Right eye: Normal extraocular motion and no nystagmus.      Left eye: Normal extraocular motion and no nystagmus.      Conjunctiva/sclera: Conjunctivae normal.      Pupils: Pupils are equal, round, and reactive to light.   Neck:      Trachea: Trachea and phonation normal.   Cardiovascular:      Rate and Rhythm: Normal rate and regular rhythm. No extrasystoles are present.     Chest Wall: PMI is not displaced.      Pulses: Normal pulses.      Heart sounds: Normal heart sounds. No murmur heard.  Pulmonary:      Effort: Pulmonary effort is normal.      Breath sounds: Normal breath sounds.   Musculoskeletal:         General: Normal range of motion.      Cervical back: Full passive range of motion without pain, normal range of motion and neck supple. No erythema. No spinous process tenderness. Normal range of motion.   Lymphadenopathy:      Cervical: No cervical adenopathy.   Skin:     General: Skin is warm and dry.      Capillary Refill: Capillary refill takes less than 2 seconds.   Neurological:      Mental Status: She is alert and oriented to person, place, and time.      GCS: GCS eye subscore is 4. GCS verbal subscore is 5. GCS motor subscore is 6.      Cranial Nerves: No cranial nerve deficit.      Sensory: No sensory deficit.      Motor: No tremor, atrophy or abnormal muscle tone.      Coordination: Coordination normal.      Gait: Gait normal.      Deep Tendon Reflexes:      Reflex Scores:       Tricep reflexes are 2+ on the right side and 2+ on the left side.        Bicep reflexes are 2+ on the right side and 2+ on the left side.       Brachioradialis reflexes are 2+ on the right side and 2+ on the left side.       Patellar reflexes are 2+ on the right side and 2+ on the left side.       Achilles reflexes are 2+ on the right side and 2+ on the left side.  Psychiatric:         Attention and Perception: Attention normal.         Mood and Affect: Mood normal.         Speech: Speech normal.         Behavior: Behavior normal. Behavior is cooperative.         Thought Content: Thought content normal.         Judgment: Judgment normal.         Assessment /Associated Orders:      No diagnosis found.      Medical Decision Making:    Pt is clinically stable at today's acute urgent care visit.  No acute distress noted. Appropriate for outpatient care at this time.   Acute problem today .   She declines emergency room evaluation for her new onset of dizziness.  There are no focal deficits and normal neurological red flag warnings.  Seek immediate FU if you have vertigo and:  Have a new or severe headache  Have a fever higher than 100.4ºF (38ºC)  Start to see double or have trouble seeing clearly  Have trouble speaking or hearing  Have weakness in an arm or leg or your face droops to one side  Pass out  Have numbness or tingling  Have chest pain  Cannot stop vomiting  Keep well hydrated  Educated in proper administration of  prescription medication(s) ordered today including safety, possible SE, risks, benefits, rationale and alternatives to therapy.       Discussed Dx, management options (risks,benefits, and alternatives to planned treatment), natural progression and supportive care.  Expressed understanding and the treatment plan was agreed upon.   Questions were encouraged and answered   Return to urgent care prn if new or worsening sx or if there is no improvement in condition prn.    Educated in Red flags and indications to immediately call 911 or present to the Emergency Department.        Time I spent evaluating Ashley Pack in urgent care today was 34  minutes. This time includes preparing for visit, reviewing any pertinent notes or test results, counseling/education, exam, obtaining HPI, interpretation of lab tests, medication management and documentation as indicated above.Time does not include separately billable procedures noted .       Please note that this dictation was created using voice recognition software. I have worked with consultants from the vendor as well as technical experts from [facility] to optimize the interface. I have made every reasonable attempt to correct obvious errors, but I expect that there are errors of grammar and possibly content that I did not discover before finalizing the note.  This note was electronically signed by provider

## 2022-12-06 ENCOUNTER — HOSPITAL ENCOUNTER (OUTPATIENT)
Dept: LAB | Facility: MEDICAL CENTER | Age: 52
End: 2022-12-06
Attending: INTERNAL MEDICINE
Payer: COMMERCIAL

## 2022-12-06 LAB
ALBUMIN SERPL BCP-MCNC: 4.7 G/DL (ref 3.2–4.9)
ALBUMIN/GLOB SERPL: 1.6 G/DL
ALP SERPL-CCNC: 78 U/L (ref 30–99)
ALT SERPL-CCNC: 18 U/L (ref 2–50)
ANION GAP SERPL CALC-SCNC: 12 MMOL/L (ref 7–16)
AST SERPL-CCNC: 14 U/L (ref 12–45)
BILIRUB SERPL-MCNC: 0.4 MG/DL (ref 0.1–1.5)
BUN SERPL-MCNC: 15 MG/DL (ref 8–22)
CALCIUM SERPL-MCNC: 9.6 MG/DL (ref 8.5–10.5)
CHLORIDE SERPL-SCNC: 99 MMOL/L (ref 96–112)
CO2 SERPL-SCNC: 27 MMOL/L (ref 20–33)
CREAT SERPL-MCNC: 0.53 MG/DL (ref 0.5–1.4)
CREAT UR-MCNC: 39.19 MG/DL
GFR SERPLBLD CREATININE-BSD FMLA CKD-EPI: 111 ML/MIN/1.73 M 2
GLOBULIN SER CALC-MCNC: 3 G/DL (ref 1.9–3.5)
GLUCOSE SERPL-MCNC: 93 MG/DL (ref 65–99)
PHOSPHATE SERPL-MCNC: 4.3 MG/DL (ref 2.5–4.5)
POTASSIUM SERPL-SCNC: 3.6 MMOL/L (ref 3.6–5.5)
POTASSIUM UR-SCNC: 41 MMOL/L
PROT SERPL-MCNC: 7.7 G/DL (ref 6–8.2)
SODIUM SERPL-SCNC: 138 MMOL/L (ref 135–145)
SODIUM UR-SCNC: 35 MMOL/L

## 2022-12-06 PROCEDURE — 80053 COMPREHEN METABOLIC PANEL: CPT

## 2022-12-06 PROCEDURE — 84105 ASSAY OF URINE PHOSPHORUS: CPT

## 2022-12-06 PROCEDURE — 36415 COLL VENOUS BLD VENIPUNCTURE: CPT

## 2022-12-06 PROCEDURE — 84133 ASSAY OF URINE POTASSIUM: CPT

## 2022-12-06 PROCEDURE — 84100 ASSAY OF PHOSPHORUS: CPT

## 2022-12-06 PROCEDURE — 84300 ASSAY OF URINE SODIUM: CPT

## 2022-12-06 PROCEDURE — 82340 ASSAY OF CALCIUM IN URINE: CPT

## 2022-12-06 PROCEDURE — 82570 ASSAY OF URINE CREATININE: CPT

## 2022-12-08 LAB
CALCIUM 24H UR-MCNC: 1.5 MG/DL
CALCIUM 24H UR-MRATE: NORMAL MG/D (ref 100–250)
CALCIUM/CREAT 24H UR: 36 MG/G (ref 20–300)
COLLECT DURATION TIME SPEC: NORMAL HRS
COLLECT DURATION TIME SPEC: NORMAL HRS
CREAT 24H UR-MCNC: 42 MG/DL
CREAT 24H UR-MRATE: NORMAL MG/D (ref 500–1400)
PHOSPHATE 24H UR-MCNC: 5 MG/DL
PHOSPHATE 24H UR-MRATE: NORMAL MG/D (ref 400–1300)
PHOSPHATE/CREAT 24H UR: 119 MG/G
SPECIMEN VOL ?TM UR: NORMAL ML
TOTAL VOLUME 1105: NORMAL ML

## 2022-12-28 RX ORDER — ROSUVASTATIN CALCIUM 5 MG/1
5 TABLET, COATED ORAL EVERY EVENING
Qty: 90 TABLET | Refills: 0 | Status: SHIPPED | OUTPATIENT
Start: 2022-12-28 | End: 2023-03-29

## 2023-02-18 ENCOUNTER — OFFICE VISIT (OUTPATIENT)
Dept: URGENT CARE | Facility: PHYSICIAN GROUP | Age: 53
End: 2023-02-18
Payer: COMMERCIAL

## 2023-02-18 VITALS
TEMPERATURE: 98.2 F | WEIGHT: 136.4 LBS | HEIGHT: 61 IN | SYSTOLIC BLOOD PRESSURE: 122 MMHG | HEART RATE: 87 BPM | OXYGEN SATURATION: 95 % | DIASTOLIC BLOOD PRESSURE: 80 MMHG | RESPIRATION RATE: 16 BRPM | BODY MASS INDEX: 25.75 KG/M2

## 2023-02-18 DIAGNOSIS — R53.83 OTHER FATIGUE: ICD-10-CM

## 2023-02-18 DIAGNOSIS — U07.1 COVID: ICD-10-CM

## 2023-02-18 DIAGNOSIS — R05.1 ACUTE COUGH: ICD-10-CM

## 2023-02-18 DIAGNOSIS — R21 RASH AND NONSPECIFIC SKIN ERUPTION: ICD-10-CM

## 2023-02-18 DIAGNOSIS — G44.89 OTHER HEADACHE SYNDROME: ICD-10-CM

## 2023-02-18 DIAGNOSIS — M79.10 MYALGIA: ICD-10-CM

## 2023-02-18 LAB
FLUAV RNA SPEC QL NAA+PROBE: NEGATIVE
FLUBV RNA SPEC QL NAA+PROBE: NEGATIVE
RSV RNA SPEC QL NAA+PROBE: NEGATIVE
SARS-COV-2 RNA RESP QL NAA+PROBE: DETECTED

## 2023-02-18 PROCEDURE — 99214 OFFICE O/P EST MOD 30 MIN: CPT | Mod: CS | Performed by: NURSE PRACTITIONER

## 2023-02-18 PROCEDURE — 0241U POCT CEPHEID COV-2, FLU A/B, RSV - PCR: CPT | Performed by: NURSE PRACTITIONER

## 2023-02-18 ASSESSMENT — FIBROSIS 4 INDEX: FIB4 SCORE: 0.63

## 2023-02-18 NOTE — LETTER
February 18, 2023    To Whom It May Concern:         This is confirmation that Ashley Pack attended her scheduled appointment with ENID Billy on 2/18/23.  Please excuse her from work on the dates of 2/16-  2/18 due to an acute illness.         If you have any questions please do not hesitate to call me at the phone number listed below.    Sincerely,          MILI BillyRKendraN.  [phone]

## 2023-02-18 NOTE — PROGRESS NOTES
Subjective:   Ashley Pack is a 52 y.o. female who presents for Coronavirus Screening (COVID booster on 14th, had a response Thursday evening, this morning lost sense of smell at home test positive, taking dayquill, nyquill, cough, fatigue, sneezing, congestion, using navage to flush had night sweats, tylenol, sinus and allergy, rash around neck and left side, teledoc visit, )       HPI  Patient presents for evaluation of 3-day history of fatigue, cough, sore throat, nasal congestion, and 1 day history of loss of sense of taste and smell.  Patient recently had a COVID booster, initiating contributing to her symptoms to her booster, however noticed loss of smell this morning.  After further discussion, patient states that her  may have been exposed to COVID a week ago.  She has not had COVID in the past 3 months.    ROS  All other systems are negative except as documented above within HPI.    MEDS:   Current Outpatient Medications:     rosuvastatin (CRESTOR) 5 MG Tab, Take 1 Tablet by mouth every evening., Disp: 90 Tablet, Rfl: 0    calcitonin, salmon, (MIACALCIN) 200 UNIT/ACT Solution, USE ONE SPRAY IN ONE NOSTRIL NIGHTLY, Disp: , Rfl:     meclizine (ANTIVERT) 25 MG Tab, Take 1 Tablet by mouth 3 times a day as needed for Nausea/Vomiting or Vertigo., Disp: 30 Tablet, Rfl: 0    progesterone (PROMETRIUM) 200 MG capsule, TAKE 1 CAPSULE BY MOUTH EVERY DAY, Disp: 30 Capsule, Rfl: 5    omeprazole (PRILOSEC) 40 MG delayed-release capsule, TAKE 1 CAPSULE BY MOUTH EVERY DAY, Disp: 30 Capsule, Rfl: 2    progesterone (PROMETRIUM) 200 MG capsule, Take 1 Cap by mouth every day., Disp: 90 Cap, Rfl: 3    Ferrous Sulfate (IRON PO), , Disp: , Rfl:     diphenhydrAMINE HCl (ALLERGY MED PO), Take 1 Tab by mouth every day., Disp: , Rfl:     Cyanocobalamin (B-12) 1000 MCG Tab, Take 1 Tab by mouth every day., Disp: , Rfl:     Cholecalciferol (D3-1000 PO), Take 1 Tab by mouth every day., Disp: , Rfl:   ALLERGIES: No Known  "Allergies    Patient's PMH, SocHx, SurgHx, FamHx, Drug allergies and medications were reviewed.     Objective:   /80   Pulse 87   Temp 36.8 °C (98.2 °F) (Temporal)   Resp 16   Ht 1.549 m (5' 1\")   Wt 61.9 kg (136 lb 6.4 oz)   SpO2 95%   BMI 25.77 kg/m²     Physical Exam  Vitals and nursing note reviewed.   Constitutional:       General: She is awake.      Appearance: Normal appearance. She is well-developed.      Comments: appears tired   HENT:      Head: Normocephalic and atraumatic.      Right Ear: Tympanic membrane, ear canal and external ear normal.      Left Ear: Tympanic membrane, ear canal and external ear normal.      Nose: Nose normal. No nasal tenderness, mucosal edema, congestion or rhinorrhea.      Right Turbinates: Enlarged.      Left Turbinates: Enlarged.      Mouth/Throat:      Lips: Pink.      Mouth: Mucous membranes are moist.      Pharynx: Oropharynx is clear. Uvula midline. Posterior oropharyngeal erythema present.   Eyes:      Extraocular Movements: Extraocular movements intact.      Conjunctiva/sclera: Conjunctivae normal.   Cardiovascular:      Rate and Rhythm: Normal rate and regular rhythm.      Pulses: Normal pulses.      Heart sounds: Normal heart sounds.   Pulmonary:      Effort: Pulmonary effort is normal.      Breath sounds: Normal breath sounds.      Comments: +cough  Musculoskeletal:         General: Normal range of motion.      Cervical back: Normal range of motion and neck supple.   Skin:     General: Skin is warm and dry.   Neurological:      General: No focal deficit present.      Mental Status: She is alert and oriented to person, place, and time.   Psychiatric:         Mood and Affect: Mood normal.         Behavior: Behavior normal. Behavior is cooperative.         Thought Content: Thought content normal.         Judgment: Judgment normal.       Assessment/Plan:   Assessment    1. COVID  - Nirmatrelvir&Ritonavir 300/100 20 x 150 MG & 10 x 100MG Tablet Therapy Pack; " Take 300 mg nirmatrelvir (two 150 mg tablets) with 100 mg ritonavir (one 100 mg tablet) by mouth, with all three tablets taken together twice daily for 5 days.  Dispense: 30 Each; Refill: 0  - benzonatate (TESSALON) 100 MG Cap; Take 1 Capsule by mouth 3 times a day as needed for Cough.  Dispense: 30 Capsule; Refill: 0    2. Rash and nonspecific skin eruption    3. Acute cough  - POCT CoV-2, Flu A/B, RSV by PCR  - benzonatate (TESSALON) 100 MG Cap; Take 1 Capsule by mouth 3 times a day as needed for Cough.  Dispense: 30 Capsule; Refill: 0    4. Other headache syndrome  - POCT CoV-2, Flu A/B, RSV by PCR    5. Other fatigue  - POCT CoV-2, Flu A/B, RSV by PCR    6. Myalgia  - POCT CoV-2, Flu A/B, RSV by PCR      Vital signs stable at today's acute urgent care visit.  Reviewed test results completed in clinic.  Patient prefers to begin Paxlovid at this time.  Congestion, Tessalon Perles sent in for relief of her cough and due to not having relief or over-the-counter medications.  Advised to home isolate per CDC guidelines.  Supportive care options also discussed, to include alternating Tylenol and Advil, cough/cold/flu OTC medications for symptomatic relief, in addition to rest, fluids as tolerated. Differential diagnosis, natural history, and indications for immediate follow-up were discussed.     Advised the patient to follow-up with the primary care provider for recheck, reevaluation, and/or consideration of further management if necessary. Return to urgent care with any worsening symptoms or if there is no improvement in their current condition. Red flags discussed and indications to immediately call 911 or present to the Emergency Department.  All questions were encouraged and answered to the patient's satisfaction and understanding, and they agree to the plan of care.     I personally reviewed prior external notes and test results pertinent to today's visit.  I have independently reviewed and interpreted all  diagnostics ordered during this urgent care acute visit. Time spent evaluating this patient was a greater than 30 minutes and includes preparing for visit, counseling/education, exam and evaluation, obtaining history, independent interpretation and ordering lab/test/procedures.      Please note that this dictation was created using voice recognition software. I have made a reasonable attempt to correct obvious errors, but I expect that there are errors of grammar and possibly content that I did not discover before finalizing the note.

## 2023-02-19 RX ORDER — BENZONATATE 100 MG/1
100 CAPSULE ORAL 3 TIMES DAILY PRN
Qty: 30 CAPSULE | Refills: 0 | Status: SHIPPED | OUTPATIENT
Start: 2023-02-19

## 2023-03-14 ENCOUNTER — HOSPITAL ENCOUNTER (OUTPATIENT)
Dept: LAB | Facility: MEDICAL CENTER | Age: 53
End: 2023-03-14
Attending: INTERNAL MEDICINE
Payer: COMMERCIAL

## 2023-03-14 LAB
ALBUMIN SERPL BCP-MCNC: 4.6 G/DL (ref 3.2–4.9)
ALBUMIN/GLOB SERPL: 1.6 G/DL
ALP SERPL-CCNC: 72 U/L (ref 30–99)
ALT SERPL-CCNC: 22 U/L (ref 2–50)
ANION GAP SERPL CALC-SCNC: 12 MMOL/L (ref 7–16)
AST SERPL-CCNC: 13 U/L (ref 12–45)
BASOPHILS # BLD AUTO: 1.1 % (ref 0–1.8)
BASOPHILS # BLD: 0.07 K/UL (ref 0–0.12)
BILIRUB SERPL-MCNC: 0.3 MG/DL (ref 0.1–1.5)
BUN SERPL-MCNC: 15 MG/DL (ref 8–22)
CALCIUM ALBUM COR SERPL-MCNC: 8.4 MG/DL (ref 8.5–10.5)
CALCIUM SERPL-MCNC: 8.9 MG/DL (ref 8.5–10.5)
CHLORIDE SERPL-SCNC: 102 MMOL/L (ref 96–112)
CO2 SERPL-SCNC: 24 MMOL/L (ref 20–33)
CREAT SERPL-MCNC: 0.54 MG/DL (ref 0.5–1.4)
CREAT UR-MCNC: 57.81 MG/DL
EOSINOPHIL # BLD AUTO: 0.21 K/UL (ref 0–0.51)
EOSINOPHIL NFR BLD: 3.3 % (ref 0–6.9)
ERYTHROCYTE [DISTWIDTH] IN BLOOD BY AUTOMATED COUNT: 44.8 FL (ref 35.9–50)
EST. AVERAGE GLUCOSE BLD GHB EST-MCNC: 120 MG/DL
FERRITIN SERPL-MCNC: 316 NG/ML (ref 10–291)
GFR SERPLBLD CREATININE-BSD FMLA CKD-EPI: 110 ML/MIN/1.73 M 2
GLOBULIN SER CALC-MCNC: 2.9 G/DL (ref 1.9–3.5)
GLUCOSE SERPL-MCNC: 100 MG/DL (ref 65–99)
HBA1C MFR BLD: 5.8 % (ref 4–5.6)
HCT VFR BLD AUTO: 42 % (ref 37–47)
HGB BLD-MCNC: 13.9 G/DL (ref 12–16)
IMM GRANULOCYTES # BLD AUTO: 0.05 K/UL (ref 0–0.11)
IMM GRANULOCYTES NFR BLD AUTO: 0.8 % (ref 0–0.9)
LYMPHOCYTES # BLD AUTO: 2.67 K/UL (ref 1–4.8)
LYMPHOCYTES NFR BLD: 42.4 % (ref 22–41)
MCH RBC QN AUTO: 31.3 PG (ref 27–33)
MCHC RBC AUTO-ENTMCNC: 33.1 G/DL (ref 33.6–35)
MCV RBC AUTO: 94.6 FL (ref 81.4–97.8)
MONOCYTES # BLD AUTO: 0.62 K/UL (ref 0–0.85)
MONOCYTES NFR BLD AUTO: 9.8 % (ref 0–13.4)
NEUTROPHILS # BLD AUTO: 2.68 K/UL (ref 2–7.15)
NEUTROPHILS NFR BLD: 42.6 % (ref 44–72)
NRBC # BLD AUTO: 0 K/UL
NRBC BLD-RTO: 0 /100 WBC
PHOSPHATE SERPL-MCNC: 3.3 MG/DL (ref 2.5–4.5)
PLATELET # BLD AUTO: 276 K/UL (ref 164–446)
PMV BLD AUTO: 11.3 FL (ref 9–12.9)
POTASSIUM SERPL-SCNC: 4.1 MMOL/L (ref 3.6–5.5)
PROT SERPL-MCNC: 7.5 G/DL (ref 6–8.2)
RBC # BLD AUTO: 4.44 M/UL (ref 4.2–5.4)
SODIUM SERPL-SCNC: 138 MMOL/L (ref 135–145)
SODIUM UR-SCNC: 73 MMOL/L
WBC # BLD AUTO: 6.3 K/UL (ref 4.8–10.8)

## 2023-03-14 PROCEDURE — 84100 ASSAY OF PHOSPHORUS: CPT

## 2023-03-14 PROCEDURE — 80053 COMPREHEN METABOLIC PANEL: CPT

## 2023-03-14 PROCEDURE — 81256 HFE GENE: CPT

## 2023-03-14 PROCEDURE — 85025 COMPLETE CBC W/AUTO DIFF WBC: CPT

## 2023-03-14 PROCEDURE — 84300 ASSAY OF URINE SODIUM: CPT

## 2023-03-14 PROCEDURE — 82570 ASSAY OF URINE CREATININE: CPT

## 2023-03-14 PROCEDURE — 84105 ASSAY OF URINE PHOSPHORUS: CPT

## 2023-03-14 PROCEDURE — 36415 COLL VENOUS BLD VENIPUNCTURE: CPT

## 2023-03-14 PROCEDURE — 83525 ASSAY OF INSULIN: CPT

## 2023-03-14 PROCEDURE — 82728 ASSAY OF FERRITIN: CPT

## 2023-03-14 PROCEDURE — 83036 HEMOGLOBIN GLYCOSYLATED A1C: CPT

## 2023-03-14 PROCEDURE — 82340 ASSAY OF CALCIUM IN URINE: CPT

## 2023-03-16 LAB
CALCIUM 24H UR-MCNC: 2.2 MG/DL
CALCIUM 24H UR-MRATE: NORMAL MG/D (ref 100–250)
CALCIUM/CREAT 24H UR: 38 MG/G (ref 20–300)
COLLECT DURATION TIME SPEC: NORMAL HRS
COLLECT DURATION TIME SPEC: NORMAL HRS
CREAT 24H UR-MCNC: 58 MG/DL
CREAT 24H UR-MRATE: NORMAL MG/D (ref 500–1400)
INSULIN P FAST SERPL-ACNC: 8 UIU/ML (ref 3–25)
PHOSPHATE 24H UR-MCNC: 19 MG/DL
PHOSPHATE 24H UR-MRATE: NORMAL MG/D (ref 400–1300)
PHOSPHATE/CREAT 24H UR: 328 MG/G
SPECIMEN VOL ?TM UR: NORMAL ML
TOTAL VOLUME 1105: NORMAL ML

## 2023-03-21 LAB
HFE GENE MUT ANL BLD/T: NORMAL
HFE P.C282Y BLD/T QL: NEGATIVE
HFE P.H63D BLD/T QL: NEGATIVE
HFE P.S65C BLD/T QL: NEGATIVE

## 2023-03-29 RX ORDER — ROSUVASTATIN CALCIUM 5 MG/1
5 TABLET, COATED ORAL EVERY EVENING
Qty: 90 TABLET | Refills: 0 | Status: SHIPPED | OUTPATIENT
Start: 2023-03-29

## 2023-03-29 NOTE — TELEPHONE ENCOUNTER
Received request via: Pharmacy    Was the patient seen in the last year in this department? Yes 5/24/22    Does the patient have an active prescription (recently filled or refills available) for medication(s) requested? No    Does the patient have FPC Plus and need 100 day supply (blood pressure, diabetes and cholesterol meds only)? Patient does not have SCP

## 2023-12-12 ENCOUNTER — HOSPITAL ENCOUNTER (OUTPATIENT)
Dept: LAB | Facility: MEDICAL CENTER | Age: 53
End: 2023-12-12
Attending: INTERNAL MEDICINE
Payer: COMMERCIAL

## 2023-12-12 LAB
ALBUMIN SERPL BCP-MCNC: 4.6 G/DL (ref 3.2–4.9)
ALBUMIN/GLOB SERPL: 1.5 G/DL
ALP SERPL-CCNC: 90 U/L (ref 30–99)
ALT SERPL-CCNC: 30 U/L (ref 2–50)
ANION GAP SERPL CALC-SCNC: 10 MMOL/L (ref 7–16)
AST SERPL-CCNC: 26 U/L (ref 12–45)
BILIRUB SERPL-MCNC: 0.4 MG/DL (ref 0.1–1.5)
BUN SERPL-MCNC: 15 MG/DL (ref 8–22)
CALCIUM ALBUM COR SERPL-MCNC: 8.8 MG/DL (ref 8.5–10.5)
CALCIUM SERPL-MCNC: 9.3 MG/DL (ref 8.5–10.5)
CHLORIDE SERPL-SCNC: 101 MMOL/L (ref 96–112)
CO2 SERPL-SCNC: 26 MMOL/L (ref 20–33)
CREAT SERPL-MCNC: 0.7 MG/DL (ref 0.5–1.4)
CREAT UR-MCNC: 181.72 MG/DL
EST. AVERAGE GLUCOSE BLD GHB EST-MCNC: 126 MG/DL
GFR SERPLBLD CREATININE-BSD FMLA CKD-EPI: 103 ML/MIN/1.73 M 2
GLOBULIN SER CALC-MCNC: 3.1 G/DL (ref 1.9–3.5)
GLUCOSE SERPL-MCNC: 112 MG/DL (ref 65–99)
HBA1C MFR BLD: 6 % (ref 4–5.6)
POTASSIUM SERPL-SCNC: 4.6 MMOL/L (ref 3.6–5.5)
POTASSIUM UR-SCNC: 92 MMOL/L
PROT SERPL-MCNC: 7.7 G/DL (ref 6–8.2)
SODIUM SERPL-SCNC: 137 MMOL/L (ref 135–145)
SODIUM UR-SCNC: 150 MMOL/L

## 2023-12-12 PROCEDURE — 84300 ASSAY OF URINE SODIUM: CPT

## 2023-12-12 PROCEDURE — 82570 ASSAY OF URINE CREATININE: CPT

## 2023-12-12 PROCEDURE — 83525 ASSAY OF INSULIN: CPT

## 2023-12-12 PROCEDURE — 36415 COLL VENOUS BLD VENIPUNCTURE: CPT

## 2023-12-12 PROCEDURE — 83945 ASSAY OF OXALATE: CPT

## 2023-12-12 PROCEDURE — 82340 ASSAY OF CALCIUM IN URINE: CPT

## 2023-12-12 PROCEDURE — 83036 HEMOGLOBIN GLYCOSYLATED A1C: CPT

## 2023-12-12 PROCEDURE — 80053 COMPREHEN METABOLIC PANEL: CPT

## 2023-12-12 PROCEDURE — 84133 ASSAY OF URINE POTASSIUM: CPT

## 2023-12-12 PROCEDURE — 84105 ASSAY OF URINE PHOSPHORUS: CPT

## 2023-12-14 LAB
CALCIUM 24H UR-MCNC: 12.9 MG/DL
CALCIUM 24H UR-MRATE: NORMAL MG/D (ref 100–250)
CALCIUM/CREAT 24H UR: 69 MG/G (ref 20–300)
COLLECT DURATION TIME SPEC: NORMAL HRS
COLLECT DURATION TIME SPEC: NORMAL HRS
CREAT 24H UR-MCNC: 188 MG/DL
CREAT 24H UR-MRATE: NORMAL MG/D (ref 500–1400)
INSULIN P FAST SERPL-ACNC: 8 UIU/ML (ref 3–25)
PHOSPHATE 24H UR-MCNC: 65 MG/DL
PHOSPHATE 24H UR-MRATE: NORMAL MG/D (ref 400–1300)
PHOSPHATE/CREAT 24H UR: 346 MG/G
SPECIMEN VOL ?TM UR: NORMAL ML
TOTAL VOLUME 1105: NORMAL ML

## 2023-12-16 LAB
COLLECT DURATION TIME SPEC: NORMAL H
CREAT 24H UR-MCNC: 183 MG/DL
OXALATE 24H UR-MCNC: 24 MG/L
OXALATE 24H UR-MRATE: NORMAL MG/D (ref 13–40)
TOTAL VOLUME 1105: NORMAL ML

## 2024-04-01 ENCOUNTER — APPOINTMENT (OUTPATIENT)
Dept: MEDICAL GROUP | Facility: PHYSICIAN GROUP | Age: 54
End: 2024-04-01
Payer: COMMERCIAL

## 2024-04-01 ENCOUNTER — HOSPITAL ENCOUNTER (OUTPATIENT)
Dept: RADIOLOGY | Facility: MEDICAL CENTER | Age: 54
End: 2024-04-01
Payer: COMMERCIAL

## 2024-04-01 ENCOUNTER — HOSPITAL ENCOUNTER (OUTPATIENT)
Dept: LAB | Facility: MEDICAL CENTER | Age: 54
End: 2024-04-01
Payer: COMMERCIAL

## 2024-04-01 VITALS
SYSTOLIC BLOOD PRESSURE: 108 MMHG | DIASTOLIC BLOOD PRESSURE: 74 MMHG | OXYGEN SATURATION: 95 % | HEIGHT: 60 IN | WEIGHT: 134.38 LBS | TEMPERATURE: 97.7 F | RESPIRATION RATE: 20 BRPM | HEART RATE: 88 BPM | BODY MASS INDEX: 26.38 KG/M2

## 2024-04-01 DIAGNOSIS — N93.9 VAGINAL BLEEDING: ICD-10-CM

## 2024-04-01 DIAGNOSIS — Z79.890 HORMONE REPLACEMENT THERAPY (HRT): ICD-10-CM

## 2024-04-01 LAB
BASOPHILS # BLD AUTO: 0.8 % (ref 0–1.8)
BASOPHILS # BLD: 0.06 K/UL (ref 0–0.12)
EOSINOPHIL # BLD AUTO: 0.11 K/UL (ref 0–0.51)
EOSINOPHIL NFR BLD: 1.5 % (ref 0–6.9)
ERYTHROCYTE [DISTWIDTH] IN BLOOD BY AUTOMATED COUNT: 44.1 FL (ref 35.9–50)
HCT VFR BLD AUTO: 43.7 % (ref 37–47)
HGB BLD-MCNC: 14.6 G/DL (ref 12–16)
IMM GRANULOCYTES # BLD AUTO: 0.03 K/UL (ref 0–0.11)
IMM GRANULOCYTES NFR BLD AUTO: 0.4 % (ref 0–0.9)
IRON SATN MFR SERPL: 29 % (ref 15–55)
IRON SERPL-MCNC: 90 UG/DL (ref 40–170)
LYMPHOCYTES # BLD AUTO: 2.85 K/UL (ref 1–4.8)
LYMPHOCYTES NFR BLD: 39.4 % (ref 22–41)
MCH RBC QN AUTO: 30.7 PG (ref 27–33)
MCHC RBC AUTO-ENTMCNC: 33.4 G/DL (ref 32.2–35.5)
MCV RBC AUTO: 91.8 FL (ref 81.4–97.8)
MONOCYTES # BLD AUTO: 0.55 K/UL (ref 0–0.85)
MONOCYTES NFR BLD AUTO: 7.6 % (ref 0–13.4)
NEUTROPHILS # BLD AUTO: 3.64 K/UL (ref 1.82–7.42)
NEUTROPHILS NFR BLD: 50.3 % (ref 44–72)
NRBC # BLD AUTO: 0 K/UL
NRBC BLD-RTO: 0 /100 WBC (ref 0–0.2)
PLATELET # BLD AUTO: 288 K/UL (ref 164–446)
PMV BLD AUTO: 11.3 FL (ref 9–12.9)
POCT INT CON NEG: NEGATIVE
POCT INT CON POS: POSITIVE
POCT URINE PREGNANCY TEST: NEGATIVE
RBC # BLD AUTO: 4.76 M/UL (ref 4.2–5.4)
TIBC SERPL-MCNC: 314 UG/DL (ref 250–450)
TSH SERPL DL<=0.005 MIU/L-ACNC: 1.13 UIU/ML (ref 0.38–5.33)
UIBC SERPL-MCNC: 224 UG/DL (ref 110–370)
WBC # BLD AUTO: 7.2 K/UL (ref 4.8–10.8)

## 2024-04-01 PROCEDURE — 3074F SYST BP LT 130 MM HG: CPT

## 2024-04-01 PROCEDURE — 99214 OFFICE O/P EST MOD 30 MIN: CPT

## 2024-04-01 PROCEDURE — 76830 TRANSVAGINAL US NON-OB: CPT

## 2024-04-01 PROCEDURE — 3078F DIAST BP <80 MM HG: CPT

## 2024-04-01 PROCEDURE — 83550 IRON BINDING TEST: CPT

## 2024-04-01 PROCEDURE — 36415 COLL VENOUS BLD VENIPUNCTURE: CPT

## 2024-04-01 PROCEDURE — 85025 COMPLETE CBC W/AUTO DIFF WBC: CPT

## 2024-04-01 PROCEDURE — 83540 ASSAY OF IRON: CPT

## 2024-04-01 PROCEDURE — 84443 ASSAY THYROID STIM HORMONE: CPT

## 2024-04-01 PROCEDURE — 81025 URINE PREGNANCY TEST: CPT

## 2024-04-01 RX ORDER — PHENTERMINE HYDROCHLORIDE 15 MG/1
15 CAPSULE ORAL 2 TIMES DAILY
COMMUNITY
Start: 2024-02-19

## 2024-04-01 RX ORDER — ESTRADIOL 0.05 MG/D
1 PATCH, EXTENDED RELEASE TRANSDERMAL
COMMUNITY
Start: 2024-03-27

## 2024-04-01 ASSESSMENT — FIBROSIS 4 INDEX: FIB4 SCORE: 0.91

## 2024-04-01 ASSESSMENT — PATIENT HEALTH QUESTIONNAIRE - PHQ9: CLINICAL INTERPRETATION OF PHQ2 SCORE: 0

## 2024-04-01 NOTE — PROGRESS NOTES
Subjective:     Chief Complaint   Patient presents with    Other     ABNORMAL bleeding started January and has not stopped.     HPI: Ashley is a 53-year-old established female patient of Dr. Ashlie Ferrell presents today with    Problem   Vaginal Bleeding    Patient reports abnormal vaginal bleeding since the end of January 2024.  Patient states that for the last 4 years she has only had some breakthrough bleeding not regular menses.  Patient reports this vaginal bleeding has been continuous and the worse bleeding has saturated through a tampon within an hour if not sooner.  Patient endorses a lot of clots as well as fatigue on really heavy bleeding days.  Patient was previously taking iron and stopped supplementing around 1 February 2024.  Endorses abdominal pain, denies any vaginal discharge only bleeding.  Does have some tenderness with intercourse.     Hormone Replacement Therapy (Hrt)    Seeing Dr. Oseas Barraza with [facility] in [facility].    Patient is currently taking estradiol patch biweekly.  Patient does still have her uterus has been off of progesterone for the last year.     Allergies: Patient has no known allergies.  ROS per HPI  Health Maintenance: Deferred at this time   Objective:     /74   Pulse 88   Temp 36.5 °C (97.7 °F) (Temporal)   Resp 20   Ht 1.524 m (5')   Wt 61 kg (134 lb 6 oz)   SpO2 95%   Breastfeeding No   BMI 26.24 kg/m²  Body mass index is 26.24 kg/m².     Physical Exam  Vitals reviewed.   Constitutional:       General: She is not in acute distress.     Appearance: Normal appearance. She is not ill-appearing.   Cardiovascular:      Rate and Rhythm: Normal rate and regular rhythm.   Pulmonary:      Effort: Pulmonary effort is normal. No respiratory distress.   Abdominal:      General: Abdomen is flat. Bowel sounds are normal. There is no distension.      Palpations: There is no hepatomegaly, splenomegaly or mass.      Tenderness: There is abdominal tenderness in the  right lower quadrant, suprapubic area and left lower quadrant. There is no guarding or rebound. Negative signs include Juárez's sign, Rovsing's sign, McBurney's sign, psoas sign and obturator sign.      Hernia: No hernia is present.   Skin:     Coloration: Skin is not jaundiced or pale.   Neurological:      General: No focal deficit present.      Mental Status: She is alert and oriented to person, place, and time.   Psychiatric:         Mood and Affect: Mood normal.         Behavior: Behavior normal.         Thought Content: Thought content normal.         Judgment: Judgment normal.       Results for orders placed or performed in visit on 04/01/24   POCT Pregnancy   Result Value Ref Range    POC Urine Pregnancy Test Negative     Internal Control Positive Positive     Internal Control Negative Negative      Assessment and Plan:     The following treatment plan was discussed through shared decision making with the patient:    1. Vaginal bleeding  Acute, complicated.  With history of HRT and lack of progesterone with uterus, we will get complete transvaginal pelvic ultrasound and referral to gynecology has been placed for urgent/stat.    Patient will complete updated lab work as most recent blood work in December 2023 did not include a CBC and recent symptoms did not occur till this year.    Patient was currently taking iron at the beginning of symptoms.  We will check for any iron deficiency anemia along with an updated CBC.  Advised patient if symptoms worsen or if she starts feeling lightheaded or dizzy and/or about to pass out and/or if she is saturating a feminine pad in under an hour.to seek care through the emergency department.  - POCT Pregnancy  - US-PELVIC COMPLETE (TRANSABDOMINAL/TRANSVAGINAL) (COMBO); Future  - CBC WITH DIFFERENTIAL; Future  - IRON/TOTAL IRON BIND; Future  - TSH; Future  - Referral to Gynecology      Return in about 4 weeks (around 4/29/2024), or if symptoms worsen or fail to improve with  PCP.         Please note that this note was created using dictation with voice recognition software. I have made every reasonable attempt to correct obvious errors, but I expect that there are errors of grammar and possibly content that I did not discover before finalizing the note.    MARIA TERESA Byers  [facility]  [facility]

## 2024-04-22 ENCOUNTER — HOSPITAL ENCOUNTER (OUTPATIENT)
Facility: MEDICAL CENTER | Age: 54
End: 2024-04-22
Attending: OBSTETRICS & GYNECOLOGY
Payer: COMMERCIAL

## 2024-04-22 ENCOUNTER — OFFICE VISIT (OUTPATIENT)
Dept: OBGYN | Facility: CLINIC | Age: 54
End: 2024-04-22
Payer: COMMERCIAL

## 2024-04-22 VITALS — BODY MASS INDEX: 26.19 KG/M2 | SYSTOLIC BLOOD PRESSURE: 114 MMHG | DIASTOLIC BLOOD PRESSURE: 80 MMHG | WEIGHT: 134.1 LBS

## 2024-04-22 DIAGNOSIS — Z01.419 WOMEN'S ANNUAL ROUTINE GYNECOLOGICAL EXAMINATION: ICD-10-CM

## 2024-04-22 DIAGNOSIS — N95.0 PMB (POSTMENOPAUSAL BLEEDING): Primary | ICD-10-CM

## 2024-04-22 PROCEDURE — 99213 OFFICE O/P EST LOW 20 MIN: CPT | Mod: 25 | Performed by: OBSTETRICS & GYNECOLOGY

## 2024-04-22 PROCEDURE — 3074F SYST BP LT 130 MM HG: CPT | Performed by: OBSTETRICS & GYNECOLOGY

## 2024-04-22 PROCEDURE — 3079F DIAST BP 80-89 MM HG: CPT | Performed by: OBSTETRICS & GYNECOLOGY

## 2024-04-22 PROCEDURE — 88175 CYTOPATH C/V AUTO FLUID REDO: CPT

## 2024-04-22 PROCEDURE — 58100 BIOPSY OF UTERUS LINING: CPT | Mod: 53 | Performed by: OBSTETRICS & GYNECOLOGY

## 2024-04-22 PROCEDURE — 87624 HPV HI-RISK TYP POOLED RSLT: CPT

## 2024-04-22 ASSESSMENT — FIBROSIS 4 INDEX: FIB4 SCORE: 0.87

## 2024-04-22 NOTE — PROGRESS NOTES
Pt here for Gyn visit  Confirmed good ph#, pharmacy, drug allergy, and medications on file.  LMP:Premenopausal  Last pap:01/20/21 Neg.  Last mammo:4/18/22 Benign  BCM:None  Pt states [facility] US Pelvic  Pt states no other complaints for today.

## 2024-04-22 NOTE — PROGRESS NOTES
"Problem visit for bleeding.  CC:  Gynecologic Exam (PMB)       HPI: Patient is a 53 y.o. year old  who complains of postmenopausal bleeding.  She thinks she went through menopause around age 48.  She had no bleeding for 1 year around age 48, but then she started having a small amount of bleeding approximately yearly.  More recently, she has had continuous bleeding since the end of January.  Sometimes she passes clots and other times it is very light.  She also feels \"tenderness\" in her uterus.  She did have a pelvic ultrasound completed that showed her endometrial lining was 11 mm.  She is currently taking a Vivelle-Dot estrogen patch.  Prior to this she was taking testosterone and progesterone supplementation for hot flashes.    Social History     Substance and Sexual Activity   Sexual Activity Yes    Partners: Male    Birth control/protection: None     Last pap: 2021 neg      ALLERGIES / REACTIONS:  No Known Allergies                      SOCIAL HISTORY:   reports that she has never smoked. She has never used smokeless tobacco. She reports current alcohol use. She reports that she does not use drugs.  Social History     Socioeconomic History    Marital status:      Spouse name: Not on file    Number of children: Not on file    Years of education: Not on file    Highest education level: 12th grade   Occupational History    Not on file   Tobacco Use    Smoking status: Never    Smokeless tobacco: Never   Vaping Use    Vaping Use: Never used   Substance and Sexual Activity    Alcohol use: Yes     Comment: Occ    Drug use: Never    Sexual activity: Yes     Partners: Male     Birth control/protection: None   Other Topics Concern    Not on file   Social History Narrative    Not on file     Social Determinants of Health     Financial Resource Strain: Low Risk  (10/18/2020)    Overall Financial Resource Strain (CARDIA)     Difficulty of Paying Living Expenses: Not hard at all   Food Insecurity: No Food " Insecurity (10/18/2020)    Hunger Vital Sign     Worried About Running Out of Food in the Last Year: Never true     Ran Out of Food in the Last Year: Never true   Transportation Needs: No Transportation Needs (10/18/2020)    PRAPARE - Transportation     Lack of Transportation (Medical): No     Lack of Transportation (Non-Medical): No   Physical Activity: Unknown (10/18/2020)    Exercise Vital Sign     Days of Exercise per Week: 3 days     Minutes of Exercise per Session: Not on file   Stress: No Stress Concern Present (10/18/2020)    [place] [employer] - Occupational Stress Questionnaire     Feeling of Stress : Only a little   Social Connections: Socially Isolated (10/18/2020)    Social Connection and Isolation Panel [NHANES]     Frequency of Communication with Friends and Family: Twice a week     Frequency of Social Gatherings with Friends and Family: Never     Attends Advent Services: Never     Active Member of Clubs or Organizations: No     Attends Club or Organization Meetings: Never     Marital Status:    Intimate Partner Violence: Not on file   Housing Stability: Low Risk  (10/18/2020)    Housing Stability Vital Sign     Unable to Pay for Housing in the Last Year: No     Number of Places Lived in the Last Year: 2     Unstable Housing in the Last Year: No         OBSTETRIC HISTORY:  OB History    Para Term  AB Living   3 3 3     3   SAB IAB Ectopic Molar Multiple Live Births             3      # Outcome Date GA Lbr Davian/2nd Weight Sex Delivery Anes PTL Lv   3 Term 02    F Vag-Spont   KAMINI   2 Term 00    M Vag-Spont   KAMINI   1 Term 03/10/97    M Vag-Spont   KAMINI       MEDICAL HISTORY:  Past Medical History:   Diagnosis Date    Gastroesophageal reflux disease without esophagitis 2021    Hyperlipidemia     Migraine 2021       MEDICATIONS:  Current Outpatient Medications on File Prior to Visit   Medication Sig Dispense Refill    estradiol (VIVELLE  DOT) 0.05 MG/24HR PATCH BIWEEKLY 1 Patch.      phentermine 15 MG capsule Take 15 mg by mouth 2 times a day.      rosuvastatin (CRESTOR) 5 MG Tab TAKE 1 TABLET BY MOUTH EVERY EVENING 90 Tablet 0    diphenhydrAMINE HCl (ALLERGY MED PO) Take 1 Tab by mouth every day.      Ferrous Sulfate (IRON PO)  (Patient not taking: Reported on 2024)       No current facility-administered medications on file prior to visit.           FAMILY HISTORY:  Family History   Problem Relation Age of Onset    Heart Disease Mother         palpitations    Diabetes Father        SURGICAL HISTORY:  Past Surgical History:   Procedure Laterality Date    BREAST RECONSTRUCTION      CARPAL TUNNEL RELEASE Right     TUBAL COAGULATION LAPAROSCOPIC BILATERAL         PHYSICAL EXAMINATION:  Vital Signs:   Vitals:    24 0816   BP: 114/80   BP Location: Left arm   Patient Position: Sitting   BP Cuff Size: Adult   Weight: 134 lb 1.6 oz     Appearance/Psychiatric: She does not appear anxious.  Constitutional: The patient is well nourished.  Neck: Neck appears symmetric.  Respiratory: Non labored breathing  Breast: Deferred  Abd: Soft, flat, non-tender  Pelvic: The vulva is normal. The urethra is normal. The vagina is normal. The cervix is normal. The perineum is normal.  Extremeties: Legs are symmetric and without tenderness.  Skin: No rash observed.    ASSESSMENT AND PLAN:  53 y.o.  complaining of postmenopausal bleeding    Ashley was seen today for gynecologic exam.    Diagnoses and all orders for this visit:    PMB (postmenopausal bleeding)  - I discussed that she needs an endometrial biopsy. I attempted an endometrial biopsy in the office today, but due to cervical stenosis, unable to obtain sample. I recommend hysteroscopy D&C. She plans to return for pre-op appointment. See procedure note for failed EMB procedure.   -     THINPREP PAP WITH HPV; Future  -     Consent for all Surgical, Special Diagnostic or Therapeutic Procedures    Women's  annual routine gynecological examination  -     MA-DIAGNOSTIC MAMMO BILAT W/TOMOSYNTHESIS W/CAD; Future    Lola Manuel M.D.  4/22/2024

## 2024-04-23 DIAGNOSIS — N95.0 PMB (POSTMENOPAUSAL BLEEDING): ICD-10-CM

## 2024-04-24 NOTE — PROCEDURES
Endometrial Biopsy    Date/Time: 4/24/2024 11:10 AM    Performed by: Lola Manuel M.D.  Authorized by: Lola Manuel M.D.    Consent:     Consent obtained: verbal and written    Consent given by: patient    Risks discussed: bleeding    Patient agrees, verbalizes understanding, and wants to proceed: yes      Procedure explained and questions answered to patient or proxy's satisfaction: yes    Universal protocol:     Relevant documents present and verified: yes      Imaging studies available: yes      Patient identity confirmed: verbally with patient  Indications:     Indications: postmenopausal bleeding    Procedure:     Prepped with: Betadine    Tenaculum used: yes      A local block was performed: yes      A local block was performed comment: 2% lidocaine jelly applied to cervix    Cervix dilated comment: Attempted, but unable to dilate  Findings:     Cervix: stenotic      Specimen collected comment: Unable to collect due to cervical stenosis  Comments:     Procedure comments: Patient had a lot of discomfort while trying to dilate cervix. Recommend hysteroscopy D&C.            Information: Selecting Yes will display possible errors in your note based on the variables you have selected. This validation is only offered as a suggestion for you. PLEASE NOTE THAT THE VALIDATION TEXT WILL BE REMOVED WHEN YOU FINALIZE YOUR NOTE. IF YOU WANT TO FAX A PRELIMINARY NOTE YOU WILL NEED TO TOGGLE THIS TO 'NO' IF YOU DO NOT WANT IT IN YOUR FAXED NOTE.

## 2024-04-26 ENCOUNTER — TELEPHONE (OUTPATIENT)
Dept: OBGYN | Facility: CLINIC | Age: 54
End: 2024-04-26
Payer: COMMERCIAL

## 2024-04-26 LAB
CYTOLOGIST CVX/VAG CYTO: ABNORMAL
CYTOLOGY CVX/VAG DOC CYTO: ABNORMAL
CYTOLOGY CVX/VAG DOC THIN PREP: ABNORMAL
DX ICD CODE: ABNORMAL
HPV I/H RISK 4 DNA CVX QL PROBE+SIG AMP: NEGATIVE
NOTE NL11727A: ABNORMAL
OTHER STN SPEC: ABNORMAL
PATHOLOGIST CVX/VAG CYTO: ABNORMAL
STAT OF ADQ CVX/VAG CYTO-IMP: ABNORMAL

## 2024-04-26 NOTE — TELEPHONE ENCOUNTER
----- Message from Lola Manuel M.D. sent at 4/26/2024  3:55 PM PDT -----  Since her HPV testing is negative, she does not need a colposcopy. It is recommended that she repeat her pap in 3 years.     4/26/2024 1605  Called pt and informed of abnormal pap, but negative for HPV. Recommended to f/u in 3 years for pap and 1 year for Well woman exam. Questions answered. Pt stated that she is waiting to hear from the surgery scheduler for hysteroscopy. Reiterated that the surgery scheduler would be calling once the surgery is scheduled. Pt verbalized understanding.

## 2024-05-01 ENCOUNTER — TELEPHONE (OUTPATIENT)
Dept: OBGYN | Facility: CLINIC | Age: 54
End: 2024-05-01
Payer: COMMERCIAL

## 2024-05-01 NOTE — TELEPHONE ENCOUNTER
Caller Name: caitlyn   Call Back Number: [phone]        Pt called in regarding scheduling sx for  hysteroscopy D&C. She stated that the soonest appt they could do would be end of June and she is wondering if there is any chance to gt this scheduled sooner or with someone else sooner? She is having lots of bleeding still and would like to get this done ASAP. Pt would like to be called back with any recommendations, thanks!

## 2024-05-02 ENCOUNTER — TELEPHONE (OUTPATIENT)
Dept: OBGYN | Facility: CLINIC | Age: 54
End: 2024-05-02
Payer: COMMERCIAL

## 2024-05-02 NOTE — TELEPHONE ENCOUNTER
Caller Name: michelle ma  Call Back Number: [phone]        [facility] for a call back to advise pt that Dr Manuel reached out to Elías to give pt a call back and see if sx can be rescheduled sooner. Left number if any further questions

## 2024-05-08 ENCOUNTER — APPOINTMENT (OUTPATIENT)
Dept: ADMISSIONS | Facility: MEDICAL CENTER | Age: 54
End: 2024-05-08
Attending: OBSTETRICS & GYNECOLOGY
Payer: COMMERCIAL

## 2024-05-16 ENCOUNTER — GYNECOLOGY VISIT (OUTPATIENT)
Dept: OBGYN | Facility: CLINIC | Age: 54
End: 2024-05-16
Payer: COMMERCIAL

## 2024-05-16 ENCOUNTER — HOSPITAL ENCOUNTER (OUTPATIENT)
Dept: LAB | Facility: MEDICAL CENTER | Age: 54
End: 2024-05-16
Attending: OBSTETRICS & GYNECOLOGY
Payer: COMMERCIAL

## 2024-05-16 ENCOUNTER — PRE-ADMISSION TESTING (OUTPATIENT)
Dept: ADMISSIONS | Facility: MEDICAL CENTER | Age: 54
End: 2024-05-16
Attending: OBSTETRICS & GYNECOLOGY
Payer: COMMERCIAL

## 2024-05-16 VITALS — SYSTOLIC BLOOD PRESSURE: 123 MMHG | WEIGHT: 134 LBS | BODY MASS INDEX: 26.17 KG/M2 | DIASTOLIC BLOOD PRESSURE: 82 MMHG

## 2024-05-16 DIAGNOSIS — Z12.39 ENCOUNTER FOR SCREENING FOR MALIGNANT NEOPLASM OF BREAST, UNSPECIFIED SCREENING MODALITY: ICD-10-CM

## 2024-05-16 DIAGNOSIS — N95.0 POSTMENOPAUSAL BLEEDING: ICD-10-CM

## 2024-05-16 DIAGNOSIS — N88.2 CERVICAL STENOSIS (UTERINE CERVIX): ICD-10-CM

## 2024-05-16 LAB
ABO GROUP BLD: NORMAL
ANION GAP SERPL CALC-SCNC: 11 MMOL/L (ref 7–16)
BASOPHILS # BLD AUTO: 0.7 % (ref 0–1.8)
BASOPHILS # BLD: 0.05 K/UL (ref 0–0.12)
BLD GP AB SCN SERPL QL: NORMAL
BUN SERPL-MCNC: 18 MG/DL (ref 8–22)
CALCIUM SERPL-MCNC: 9.4 MG/DL (ref 8.5–10.5)
CHLORIDE SERPL-SCNC: 103 MMOL/L (ref 96–112)
CO2 SERPL-SCNC: 27 MMOL/L (ref 20–33)
CREAT SERPL-MCNC: 0.9 MG/DL (ref 0.5–1.4)
EOSINOPHIL # BLD AUTO: 0.13 K/UL (ref 0–0.51)
EOSINOPHIL NFR BLD: 1.7 % (ref 0–6.9)
ERYTHROCYTE [DISTWIDTH] IN BLOOD BY AUTOMATED COUNT: 45 FL (ref 35.9–50)
GFR SERPLBLD CREATININE-BSD FMLA CKD-EPI: 76 ML/MIN/1.73 M 2
GLUCOSE SERPL-MCNC: 109 MG/DL (ref 65–99)
HCT VFR BLD AUTO: 41.1 % (ref 37–47)
HGB BLD-MCNC: 13.5 G/DL (ref 12–16)
IMM GRANULOCYTES # BLD AUTO: 0.03 K/UL (ref 0–0.11)
IMM GRANULOCYTES NFR BLD AUTO: 0.4 % (ref 0–0.9)
LYMPHOCYTES # BLD AUTO: 3.69 K/UL (ref 1–4.8)
LYMPHOCYTES NFR BLD: 49.3 % (ref 22–41)
MCH RBC QN AUTO: 30.4 PG (ref 27–33)
MCHC RBC AUTO-ENTMCNC: 32.8 G/DL (ref 32.2–35.5)
MCV RBC AUTO: 92.6 FL (ref 81.4–97.8)
MONOCYTES # BLD AUTO: 0.61 K/UL (ref 0–0.85)
MONOCYTES NFR BLD AUTO: 8.1 % (ref 0–13.4)
NEUTROPHILS # BLD AUTO: 2.98 K/UL (ref 1.82–7.42)
NEUTROPHILS NFR BLD: 39.8 % (ref 44–72)
NRBC # BLD AUTO: 0 K/UL
NRBC BLD-RTO: 0 /100 WBC (ref 0–0.2)
PLATELET # BLD AUTO: 295 K/UL (ref 164–446)
PMV BLD AUTO: 11.5 FL (ref 9–12.9)
POTASSIUM SERPL-SCNC: 4.4 MMOL/L (ref 3.6–5.5)
RBC # BLD AUTO: 4.44 M/UL (ref 4.2–5.4)
RH BLD: NORMAL
SODIUM SERPL-SCNC: 141 MMOL/L (ref 135–145)
WBC # BLD AUTO: 7.5 K/UL (ref 4.8–10.8)

## 2024-05-16 PROCEDURE — 99999 PR NO CHARGE: CPT | Performed by: OBSTETRICS & GYNECOLOGY

## 2024-05-16 RX ORDER — MISOPROSTOL 200 UG/1
200 TABLET ORAL ONCE
Qty: 1 TABLET | Refills: 0 | Status: SHIPPED | OUTPATIENT
Start: 2024-05-16 | End: 2024-05-16

## 2024-05-16 ASSESSMENT — FIBROSIS 4 INDEX: FIB4 SCORE: 0.87

## 2024-05-16 NOTE — LETTER
May 16, 2024         Patient: Ashley Pack   YOB: 1970   Date of Visit: 5/16/2024           To Whom it May Concern:    Ashley Pack was seen in my clinic on 5/16/2024 for a Pre-op appointment. She was scheduled for surgery on May 29, 2024 but due to her medical diagnosis that we are going to reschedule her surgery for tomorrow which is 5/17/24.    If you have any questions or concerns, please don't hesitate to call at [phone].        Sincerely,           Russell Montenegro M.D.  Electronically Signed

## 2024-05-16 NOTE — PROGRESS NOTES
GYNECOLOGY PRE-OPERATIVE HISTORY AND PHYSICAL    CC: Gynecologic Exam (GYN Visit- Pre-op Hysteroscopy D+C , Polypectomy 2024)      HPI: Patient is a 53 y.o.  who presents for her pre-operative history and physical. She is scheduled to undergo D&C hysteroscopy with polypectomy for postmenopausal bleeding. The patient has been bleeding heavily since January. She was on unopposed estrogen for 6-7 months. She did discontinue this after her visit with Dr. Manuel 24. EMB attempt was not successful due to cervical stenosis. We discussed that the D&C hysteroscopy with directed biopsy to rule out endometrial cancer is indicated as soon as possible. It was rescheduled for 2024. She was given 200mcg of misoprostol to take tonight prior to leaving clinic and she is going to stop at the lab and do her lab work.     GYNECOLOGIC HISTORY:  Current contraceptive: None, she is postmenopausal   Last Pap: 2024      OBSTETRIC HISTORY:  OB History    Para Term  AB Living   3 3 3     3   SAB IAB Ectopic Molar Multiple Live Births             3      # Outcome Date GA Lbr Davian/2nd Weight Sex Delivery Anes PTL Lv   3 Term 02    F Vag-Spont   KAMINI   2 Term 00    M Vag-Spont   KAMINI   1 Term 03/10/97    M Vag-Spont   KAMINI       MEDICAL HISTORY:  Past Medical History:   Diagnosis Date    Anesthesia     See above    Awareness under anesthesia     During Carpul tunnel release, I was aware of talking while still under for surgery    Dental disorder 2024    dental implant    Diabetes (HCC) 2024    prediabetic    Gastroesophageal reflux disease without esophagitis 2021    Gynecological disorder 2024    Heavy bleeding started last week of January and would not stop    Hyperlipidemia     Migraine 2021       MEDICATIONS:  Current Outpatient Medications on File Prior to Visit   Medication Sig Dispense Refill    phentermine 15 MG capsule Take 15 mg by mouth 2 times a day.       rosuvastatin (CRESTOR) 5 MG Tab TAKE 1 TABLET BY MOUTH EVERY EVENING 90 Tablet 0    diphenhydrAMINE HCl (ALLERGY MED PO) Take 1 Tab by mouth every day.      estradiol (VIVELLE DOT) 0.05 MG/24HR PATCH BIWEEKLY 1 Patch. (Patient not taking: Reported on 2024)       No current facility-administered medications on file prior to visit.       ALLERGIES / REACTIONS:  No Known Allergies    FAMILY HISTORY:  Family History   Problem Relation Age of Onset    Heart Disease Mother         palpitations    Hypertension Mother     Diabetes Father        SURGICAL HISTORY:  Past Surgical History:   Procedure Laterality Date    BREAST RECONSTRUCTION      CARPAL TUNNEL RELEASE Right     OTHER  2018    Breast augmentation    TUBAL COAGULATION LAPAROSCOPIC BILATERAL         SOCIAL HISTORY:   reports that she quit smoking about 30 years ago. Her smoking use included cigarettes. She has never been exposed to tobacco smoke. She has never used smokeless tobacco. She reports current alcohol use of about 3.0 oz of alcohol per week. She reports that she does not use drugs.  Social History     Socioeconomic History    Marital status:      Spouse name: Not on file    Number of children: Not on file    Years of education: Not on file    Highest education level: 12th grade   Occupational History    Not on file   Tobacco Use    Smoking status: Former     Current packs/day: 0.00     Types: Cigarettes     Quit date: 1993     Years since quittin.3     Passive exposure: Never    Smokeless tobacco: Never    Tobacco comments:     Occasionally   Vaping Use    Vaping status: Never Used   Substance and Sexual Activity    Alcohol use: Yes     Alcohol/week: 3.0 oz     Types: 5 Cans of beer per week     Comment: Socially    Drug use: Never    Sexual activity: Yes     Partners: Male     Birth control/protection: None   Other Topics Concern    Not on file   Social History Narrative    Not on file     Social Determinants of Health      Financial Resource Strain: Low Risk  (10/18/2020)    Overall Financial Resource Strain (CARDIA)     Difficulty of Paying Living Expenses: Not hard at all   Food Insecurity: No Food Insecurity (10/18/2020)    Hunger Vital Sign     Worried About Running Out of Food in the Last Year: Never true     Ran Out of Food in the Last Year: Never true   Transportation Needs: No Transportation Needs (10/18/2020)    PRAPARE - Transportation     Lack of Transportation (Medical): No     Lack of Transportation (Non-Medical): No   Physical Activity: Unknown (10/18/2020)    Exercise Vital Sign     Days of Exercise per Week: 3 days     Minutes of Exercise per Session: Not on file   Stress: No Stress Concern Present (10/18/2020)    [place] [employer] - Occupational Stress Questionnaire     Feeling of Stress : Only a little   Social Connections: Socially Isolated (10/18/2020)    Social Connection and Isolation Panel [NHANES]     Frequency of Communication with Friends and Family: Twice a week     Frequency of Social Gatherings with Friends and Family: Never     Attends Buddhist Services: Never     Active Member of Clubs or Organizations: No     Attends Club or Organization Meetings: Never     Marital Status:    Intimate Partner Violence: Not on file   Housing Stability: Low Risk  (10/18/2020)    Housing Stability Vital Sign     Unable to Pay for Housing in the Last Year: No     Number of Places Lived in the Last Year: 2     Unstable Housing in the Last Year: No         ROS:  General: Fever: no  HEENT: Sore Throat: no  CV: Chest Pain: no  Repiratory: Shortness of Breath: no  GI: Abdominal pain: no  : Dysuria: no, postmenopausal bleeding x5 months     PHYSICAL EXAMINATION:  Vital Signs:   Vitals:    05/16/24 1501   BP: 123/82   BP Location: Right arm   Patient Position: Sitting   Weight: 134 lb     Appearance/Psychiatric: She does not appear anxious.  Constitutional: The patient is well nourished.  Neck:  Neck appears symmetric.  Heart: regular rate and rhythm  Lungs:clear to auscultation, Respirations unlabored, and no use of accessory muscles of inspiration noted  Abd: Soft, flat and non-tender and No masses or organomegaly  Extremeties: Legs are symmetric and without tenderness.  Skin: No rash observed.      LABS / [id number] 5:44 PM     HISTORY/REASON FOR EXAM:  Vaginal Bleeding        TECHNIQUE/EXAM DESCRIPTION:  Transabdominal and transvaginal pelvic ultrasound.     COMPARISON:   None     FINDINGS:  Both transabdominal and transvaginal scanning were performed to optimally visualize the pelvis.     UTERUS:  The uterus measures 4.24 cm x 8.18 cm x 4.83 cm.  The uterine myometrium is within normal limits.  The endometrial echo complex measures 1.13 cm. The medial stripe is abnormally thickened.        OVARIES:  The right ovary measures 3.05 cm x 1.15 cm x 1.90 cm. Duplex Doppler examination of the right ovary shows normal waveforms.     The left ovary measures 2.16 cm x 1.46 cm x 1.24 cm. Duplex Doppler examination of the left ovary shows normal waveforms.           There is no free fluid seen.     IMPRESSION:     1.  Abnormal endometrial stripe thickening measuring 11 mm.     2.  In this age group this could indicate endometrial hyperplasia or carcinoma     3.  Normal appearance of the uterine myometrium in both ovary      ASSESSMENT AND PLAN:  53 y.o.      1. Postmenopausal bleeding        2. Cervical stenosis (uterine cervix)  miSOPROStol (CYTOTEC) 200 MCG Tab      3. Encounter for screening for malignant neoplasm of breast, unspecified screening modality  MA-SCREENING MAMMO BILAT W/IMPLANTS W/VAUGHN W/O CAD          Surgery indications: Postmenopausal bleeding     Surgeries planned: D&C hysteroscopy with Myosure       Russell Montenegro M.D.

## 2024-05-17 ENCOUNTER — ANESTHESIA EVENT (OUTPATIENT)
Dept: SURGERY | Facility: MEDICAL CENTER | Age: 54
End: 2024-05-17
Payer: COMMERCIAL

## 2024-05-17 ENCOUNTER — HOSPITAL ENCOUNTER (OUTPATIENT)
Facility: MEDICAL CENTER | Age: 54
End: 2024-05-17
Attending: OBSTETRICS & GYNECOLOGY | Admitting: OBSTETRICS & GYNECOLOGY
Payer: COMMERCIAL

## 2024-05-17 ENCOUNTER — ANESTHESIA (OUTPATIENT)
Dept: SURGERY | Facility: MEDICAL CENTER | Age: 54
End: 2024-05-17
Payer: COMMERCIAL

## 2024-05-17 VITALS
BODY MASS INDEX: 25.41 KG/M2 | OXYGEN SATURATION: 95 % | SYSTOLIC BLOOD PRESSURE: 108 MMHG | TEMPERATURE: 97.2 F | DIASTOLIC BLOOD PRESSURE: 63 MMHG | WEIGHT: 129.41 LBS | HEART RATE: 75 BPM | RESPIRATION RATE: 16 BRPM | HEIGHT: 60 IN

## 2024-05-17 DIAGNOSIS — Z98.890 S/P D&C (STATUS POST DILATION AND CURETTAGE): ICD-10-CM

## 2024-05-17 LAB
ABO GROUP BLD: NORMAL
BLD GP AB SCN SERPL QL: NORMAL
PATHOLOGY CONSULT NOTE: NORMAL
RH BLD: NORMAL

## 2024-05-17 PROCEDURE — 58558 HYSTEROSCOPY BIOPSY: CPT | Performed by: OBSTETRICS & GYNECOLOGY

## 2024-05-17 RX ORDER — ACETAMINOPHEN 500 MG
1000 TABLET ORAL ONCE
Status: COMPLETED | OUTPATIENT
Start: 2024-05-17 | End: 2024-05-17

## 2024-05-17 RX ORDER — ONDANSETRON 2 MG/ML
4 INJECTION INTRAMUSCULAR; INTRAVENOUS
Status: COMPLETED | OUTPATIENT
Start: 2024-05-17 | End: 2024-05-17

## 2024-05-17 RX ORDER — LIDOCAINE HYDROCHLORIDE 20 MG/ML
INJECTION, SOLUTION EPIDURAL; INFILTRATION; INTRACAUDAL; PERINEURAL PRN
Status: DISCONTINUED | OUTPATIENT
Start: 2024-05-17 | End: 2024-05-17 | Stop reason: SURG

## 2024-05-17 RX ORDER — EPHEDRINE SULFATE 50 MG/ML
5 INJECTION, SOLUTION INTRAVENOUS
Status: DISCONTINUED | OUTPATIENT
Start: 2024-05-17 | End: 2024-05-17 | Stop reason: HOSPADM

## 2024-05-17 RX ORDER — DIPHENHYDRAMINE HYDROCHLORIDE 50 MG/ML
12.5 INJECTION INTRAMUSCULAR; INTRAVENOUS
Status: DISCONTINUED | OUTPATIENT
Start: 2024-05-17 | End: 2024-05-17 | Stop reason: HOSPADM

## 2024-05-17 RX ORDER — LABETALOL HYDROCHLORIDE 5 MG/ML
5 INJECTION, SOLUTION INTRAVENOUS
Status: DISCONTINUED | OUTPATIENT
Start: 2024-05-17 | End: 2024-05-17 | Stop reason: HOSPADM

## 2024-05-17 RX ORDER — SODIUM CHLORIDE, SODIUM LACTATE, POTASSIUM CHLORIDE, CALCIUM CHLORIDE 600; 310; 30; 20 MG/100ML; MG/100ML; MG/100ML; MG/100ML
INJECTION, SOLUTION INTRAVENOUS CONTINUOUS
Status: ACTIVE | OUTPATIENT
Start: 2024-05-17 | End: 2024-05-17

## 2024-05-17 RX ORDER — SODIUM CHLORIDE, SODIUM LACTATE, POTASSIUM CHLORIDE, CALCIUM CHLORIDE 600; 310; 30; 20 MG/100ML; MG/100ML; MG/100ML; MG/100ML
INJECTION, SOLUTION INTRAVENOUS CONTINUOUS
Status: DISCONTINUED | OUTPATIENT
Start: 2024-05-17 | End: 2024-05-17 | Stop reason: HOSPADM

## 2024-05-17 RX ORDER — MIDAZOLAM HYDROCHLORIDE 1 MG/ML
INJECTION INTRAMUSCULAR; INTRAVENOUS PRN
Status: DISCONTINUED | OUTPATIENT
Start: 2024-05-17 | End: 2024-05-17 | Stop reason: SURG

## 2024-05-17 RX ORDER — HYDROMORPHONE HYDROCHLORIDE 1 MG/ML
0.2 INJECTION, SOLUTION INTRAMUSCULAR; INTRAVENOUS; SUBCUTANEOUS
Status: DISCONTINUED | OUTPATIENT
Start: 2024-05-17 | End: 2024-05-17 | Stop reason: HOSPADM

## 2024-05-17 RX ORDER — HALOPERIDOL 5 MG/ML
1 INJECTION INTRAMUSCULAR
Status: DISCONTINUED | OUTPATIENT
Start: 2024-05-17 | End: 2024-05-17 | Stop reason: HOSPADM

## 2024-05-17 RX ORDER — HYDROMORPHONE HYDROCHLORIDE 1 MG/ML
0.1 INJECTION, SOLUTION INTRAMUSCULAR; INTRAVENOUS; SUBCUTANEOUS
Status: DISCONTINUED | OUTPATIENT
Start: 2024-05-17 | End: 2024-05-17 | Stop reason: HOSPADM

## 2024-05-17 RX ORDER — DEXAMETHASONE SODIUM PHOSPHATE 4 MG/ML
INJECTION, SOLUTION INTRA-ARTICULAR; INTRALESIONAL; INTRAMUSCULAR; INTRAVENOUS; SOFT TISSUE PRN
Status: DISCONTINUED | OUTPATIENT
Start: 2024-05-17 | End: 2024-05-17 | Stop reason: SURG

## 2024-05-17 RX ORDER — KETOROLAC TROMETHAMINE 15 MG/ML
INJECTION, SOLUTION INTRAMUSCULAR; INTRAVENOUS PRN
Status: DISCONTINUED | OUTPATIENT
Start: 2024-05-17 | End: 2024-05-17 | Stop reason: SURG

## 2024-05-17 RX ORDER — CEFAZOLIN SODIUM 1 G/3ML
INJECTION, POWDER, FOR SOLUTION INTRAMUSCULAR; INTRAVENOUS PRN
Status: DISCONTINUED | OUTPATIENT
Start: 2024-05-17 | End: 2024-05-27 | Stop reason: HOSPADM

## 2024-05-17 RX ORDER — ONDANSETRON 2 MG/ML
INJECTION INTRAMUSCULAR; INTRAVENOUS PRN
Status: DISCONTINUED | OUTPATIENT
Start: 2024-05-17 | End: 2024-05-17 | Stop reason: SURG

## 2024-05-17 RX ORDER — HYDRALAZINE HYDROCHLORIDE 20 MG/ML
5 INJECTION INTRAMUSCULAR; INTRAVENOUS
Status: DISCONTINUED | OUTPATIENT
Start: 2024-05-17 | End: 2024-05-17 | Stop reason: HOSPADM

## 2024-05-17 RX ORDER — HYDROMORPHONE HYDROCHLORIDE 1 MG/ML
0.4 INJECTION, SOLUTION INTRAMUSCULAR; INTRAVENOUS; SUBCUTANEOUS
Status: DISCONTINUED | OUTPATIENT
Start: 2024-05-17 | End: 2024-05-17 | Stop reason: HOSPADM

## 2024-05-17 RX ORDER — OXYCODONE HCL 5 MG/5 ML
10 SOLUTION, ORAL ORAL
Status: COMPLETED | OUTPATIENT
Start: 2024-05-17 | End: 2024-05-17

## 2024-05-17 RX ORDER — IBUPROFEN 600 MG/1
600 TABLET ORAL EVERY 6 HOURS PRN
Qty: 30 TABLET | Refills: 0 | Status: SHIPPED | OUTPATIENT
Start: 2024-05-17

## 2024-05-17 RX ORDER — OXYCODONE HCL 5 MG/5 ML
5 SOLUTION, ORAL ORAL
Status: COMPLETED | OUTPATIENT
Start: 2024-05-17 | End: 2024-05-17

## 2024-05-17 RX ADMIN — HALOPERIDOL LACTATE 1 MG: 5 INJECTION, SOLUTION INTRAMUSCULAR at 13:48

## 2024-05-17 RX ADMIN — FENTANYL CITRATE 25 MCG: 50 INJECTION, SOLUTION INTRAMUSCULAR; INTRAVENOUS at 12:55

## 2024-05-17 RX ADMIN — ACETAMINOPHEN 1000 MG: 500 TABLET, FILM COATED ORAL at 11:55

## 2024-05-17 RX ADMIN — ONDANSETRON 4 MG: 2 INJECTION INTRAMUSCULAR; INTRAVENOUS at 13:29

## 2024-05-17 RX ADMIN — KETOROLAC TROMETHAMINE 15 MG: 15 INJECTION, SOLUTION INTRAMUSCULAR; INTRAVENOUS at 12:52

## 2024-05-17 RX ADMIN — SODIUM CHLORIDE, POTASSIUM CHLORIDE, SODIUM LACTATE AND CALCIUM CHLORIDE: 600; 310; 30; 20 INJECTION, SOLUTION INTRAVENOUS at 12:20

## 2024-05-17 RX ADMIN — MIDAZOLAM HYDROCHLORIDE 2 MG: 1 INJECTION, SOLUTION INTRAMUSCULAR; INTRAVENOUS at 12:20

## 2024-05-17 RX ADMIN — DEXAMETHASONE SODIUM PHOSPHATE 8 MG: 4 INJECTION INTRA-ARTICULAR; INTRALESIONAL; INTRAMUSCULAR; INTRAVENOUS; SOFT TISSUE at 12:34

## 2024-05-17 RX ADMIN — FENTANYL CITRATE 50 MCG: 50 INJECTION, SOLUTION INTRAMUSCULAR; INTRAVENOUS at 12:24

## 2024-05-17 RX ADMIN — FENTANYL CITRATE 50 MCG: 50 INJECTION, SOLUTION INTRAMUSCULAR; INTRAVENOUS at 13:40

## 2024-05-17 RX ADMIN — ONDANSETRON 4 MG: 2 INJECTION INTRAMUSCULAR; INTRAVENOUS at 12:52

## 2024-05-17 RX ADMIN — LIDOCAINE HYDROCHLORIDE 80 MG: 20 INJECTION, SOLUTION EPIDURAL; INFILTRATION; INTRACAUDAL at 12:26

## 2024-05-17 RX ADMIN — FENTANYL CITRATE 50 MCG: 50 INJECTION, SOLUTION INTRAMUSCULAR; INTRAVENOUS at 13:28

## 2024-05-17 RX ADMIN — CEFAZOLIN 2 G: 1 INJECTION, POWDER, FOR SOLUTION INTRAMUSCULAR; INTRAVENOUS at 12:26

## 2024-05-17 RX ADMIN — OXYCODONE HYDROCHLORIDE 10 MG: 5 SOLUTION ORAL at 13:28

## 2024-05-17 RX ADMIN — PROPOFOL 160 MG: 10 INJECTION, EMULSION INTRAVENOUS at 12:26

## 2024-05-17 ASSESSMENT — FIBROSIS 4 INDEX: FIB4 SCORE: 0.85

## 2024-05-17 ASSESSMENT — PAIN DESCRIPTION - PAIN TYPE
TYPE: SURGICAL PAIN

## 2024-05-17 NOTE — OR NURSING
1339 Report from DANIEL Silva. Care assumed.     1340 Medicated for pain.    1348 Medicated for nausea.     1403 Pain now tolerable 4/10, nausea resolving. Declines further needs, weaning O2.  Stable for phase II.     1409 Report to DAINEL Alvarado.     1516 Dressed with minimal assistance, up in chair.  at bs.     1545 Discharge orders received. Ambulated to bathroom, voids without difficulty. Meets discharge criteria at this time. Tolerable pain. No nausea. Tolerating PO. On RA. All lines and monitors discontinued. Reviewed discharge paperwork with pt and spouse. Discussed diet, activity, medications, follow up care and worsening symptoms. No questions at this time. Pt to be discharged to home via private vehicle. Pt escorted out of department in wheelchair with CNA, spouse, and all belongings including glasses.

## 2024-05-17 NOTE — ANESTHESIA TIME REPORT
Anesthesia Start and Stop Event Times       Date Time Event    5/17/2024 1200 Ready for Procedure     1220 Anesthesia Start     1305 Anesthesia Stop          Responsible Staff  05/17/24      Name Role Begin End    Shorty Silva M.D. Anesth 1220 1305          Overtime Reason:  no overtime (within assigned shift)    Comments:

## 2024-05-17 NOTE — DISCHARGE INSTRUCTIONS
If any questions arise, call your provider.  If your provider is not available, please feel free to call the Surgical Center at [phone].    MEDICATIONS: Resume taking daily medication.  Take prescribed pain medication with food.  If no medication is prescribed, you may take non-aspirin pain medication if needed.  PAIN MEDICATION CAN BE VERY CONSTIPATING.  Take a stool softener or laxative such as senokot, pericolace, or milk of magnesia if needed.    Last pain medication given at   11:55 am- Tylenol, start next dose of Tylenol at 6 pm.  12:52pm- Toradol (NSAID) start Ibuprofen at 7 pm.     What to Expect Post Anesthesia    Rest and take it easy for the first 24 hours.  A responsible adult is recommended to remain with you during that time.  It is normal to feel sleepy.  We encourage you to not do anything that requires balance, judgment or coordination.    FOR 24 HOURS DO NOT:  Drive, operate machinery or run household appliances.  Drink beer or alcoholic beverages.  Make important decisions or sign legal documents.    To avoid nausea, slowly advance diet as tolerated, avoiding spicy or greasy foods for the first day.  Add more substantial food to your diet according to your provider's instructions.  Babies can be fed formula or breast milk as soon as they are hungry.  INCREASE FLUIDS AND FIBER TO AVOID CONSTIPATION.    MILD FLU-LIKE SYMPTOMS ARE NORMAL.  YOU MAY EXPERIENCE GENERALIZED MUSCLE ACHES, THROAT IRRITATION, HEADACHE AND/OR SOME NAUSEA.    Hysteroscopy Discharge instructions    ACTIVITIES:  DO NOT USE tampons, douche, or have sexual intercourse for 2 weeks post surgery.    After discharge from the hospital, rest today, then you may resume full routine activities when you feel ready.     DRIVING:   You may drive whenever you are off pain medications and are able to perform the activities needed to drive, i.e. turning, bending, twisting, etc.    BATHING:   You may shower starting tomorrow. No tub  baths, hot tubs, or swimming until your follow up appointment.     WOUND CARE:  You may experience some cramping discomfort for several days. It is normal to have a brownish to slightly bloody discharge after surgery.    Expect some vaginal bleeding, however if you pass clots or saturate a aleisha pad more often than once an hour or are not comfortable with the amount of blood you notice please notify your provider.      BOWEL FUNCTION:  Constipation is common after surgery. The combination of pain medication and decreased activity level can cause constipation in otherwise normal patients. If you feel this is occurring, take a laxative (Milk of Magnesia, Ex-Lax, Senokot, etc.) until the problem has resolved. It also helps to stay regular by including fiber in your diet (for example: bran or fruits and vegetables) and drink plenty of liquids (water, juice, etc.).

## 2024-05-17 NOTE — OR NURSING
Patient's surgery has been moved forward from 5/29 to 5/17. Pre-op supervisor and Inessa at [facility] notified that patient is currently taking phentermine.

## 2024-05-17 NOTE — ANESTHESIA PROCEDURE NOTES
Airway    Date/Time: 5/17/2024 12:27 PM    Performed by: Shorty Silva M.D.  Authorized by: Shorty Silva M.D.    Location:  OR  Urgency:  Elective  Indications for Airway Management:  Anesthesia      Spontaneous Ventilation: absent    Sedation Level:  Deep  Preoxygenated: Yes    Mask Difficulty Assessment:  0 - not attempted  Final Airway Type:  Supraglottic airway  Final Supraglottic Airway:  Standard LMA    SGA Size:  4  Number of Attempts at Approach:  1  Number of Other Approaches Attempted:  0

## 2024-05-17 NOTE — OR NURSING
1302 - Pt to PACU from OR. Report from anesthesia and OR RN. On 6L O2 via mask. Respirations even and unlabored. VSS. Oral airway in place. Peripad in place, C/D/I.    1316 - Handoff to DANIEL Silva

## 2024-05-17 NOTE — ANESTHESIA PREPROCEDURE EVALUATION
Case: [id number] Anesthesia Start Date/Time: 05/17/24 1220    Procedures:       HYSTEROSCOPY, DILATION AND CURETTAGE WITH POLYPECTOMY.      DILATION AND CURETTAGE    Pre-op diagnosis: POSTMENOPAUSAL BLEEDING    Location: [facility] ROOM 21 / SURGERY SAME DAY [facility]    Surgeons: Russell Montenegro M.D.          Pt last took phenteramine yesterday. Decision made to proceed as there is a high suspicion for endometrial cancer and don't want to delay a possible diagnosis.     Relevant Problems   NEURO   (positive) Migraine      CARDIAC   (positive) Migraine      GI   (positive) Gastroesophageal reflux disease without esophagitis      Other   (positive) Vaginal bleeding       Physical Exam    Airway   Mallampati: II  TM distance: >3 FB  Neck ROM: full       Cardiovascular - normal exam  Rhythm: regular  Rate: normal  (-) murmur     Dental - normal exam           Pulmonary - normal exam  Breath sounds clear to auscultation     Abdominal    Neurological - normal exam                   Anesthesia Plan    ASA 2       Plan - general       Airway plan will be LMA          Induction: intravenous    Postoperative Plan: Postoperative administration of opioids is intended.    Pertinent diagnostic labs and testing reviewed    Informed Consent:    Anesthetic plan and risks discussed with patient.    Use of blood products discussed with: patient whom consented to blood products.

## 2024-05-17 NOTE — OP REPORT
DILATION AND CURETTAGE WITH MYOSURE OPERATIVE NOTE:    Patient Name: Ashley Pack    YOB: 1970    MRN: [id number]    DATE OF SURGERY: 5/17/2024    PREOPERATIVE DIAGNOSIS:  1. Postmenopausal bleeding  2. Recent history of 6-7 months of unopposed estrogen  3. Thickened endometrial stripe     POSTOPERATIVE DIAGNOSIS:  1. Postmenopausal bleeding  2. Recent history of 6-7 months of unopposed estrogen  3. Suspected uterine polyp      PROCEDURE:  1. Hysteroscopy  2. Dilation and Curettage  3. Polypectomy  with MyoSure    SURGEON: Russell Montenegro MD    ASSISTANT: N/A    ANESTHESIA: General     ESTIMATED BLOOD LOSS: 2ml    COMPLICATIONS: None, patient tolerated well    SPECIMENS:  1. Endometrial curettings    FINDINGS: 8cm sized uterus with a smooth contour. Hysteroscopy showed normal appearing bilateral tubal ostia and a large uterine polyp     DESCRIPTION OF PROCEDURE:  The patient was taken to the operating room where general anesthesia was administered without difficulty. She was placed in the dorsal lithotomy position with legs in the Mao-type stirrups. She was prepared and draped in the normal sterile fashion. An examination under anesthesia revealed a normal sized uterus.    A speculum was inserted in the posterior aspect of the vagina. A tenaculum was used to grasp the anterior lip of the cervix. The uterus was carefully sounded to 7cm. The cervical os was sequentially dilated to accommodate the 5-mm hysteroscope using Landon dilators. A 0 degree custom hysteroscope was introduced under direct visualization, and the uterus was distended with normal saline. The above noted findings were observed. The MyoSure was placed through the operative port, and with suction, the MyoSure was activated and the polyp was then shaved to the surface of the uterine cavity. The hysteroscope was then removed and a pass of sharp curettage was performed in a clockwise fashion until a gritty feeling was noted in all  aspects of the uterus. The endometrial scrapings were sent to pathology. The tenaculum was removed from the cervix and good hemostasis was noted at the puncture site. All instruments were removed from the patient’s vagina. Hemostasis was noted from the tenaculum site. The patient was taken to the recovery room in stable condition.    The patient tolerated the procedure well. The instrument and sponge counts were correct times two. The patient was awakened from general anesthesia  and taken to the recover room in stable condition.    The patient will go home after recovering from anesthesia and meeting all the criteria for discharge. She was given instruction regarding follow-up visit in 1-2 weeks and a prescription for pain medication.    Russell Montenegro M.D.  Obstetrics and Gynecology  5/17/2024 1:03 PM

## 2024-05-17 NOTE — OR NURSING
*This is a Running Note*    1316 Received report from Jenny SANCHEZ. Assumed care of patient.    1320 Oral Airway out,  Pt Now on RA, Declined water at this time, having pain see MAR    1339 Pt requiring 1 L of oxygen at this time.  Handoff care to Lisa AMEZQUITA RN.  .

## 2024-05-27 ASSESSMENT — PAIN SCALES - GENERAL: PAIN_LEVEL: 4

## 2024-05-27 NOTE — ANESTHESIA POSTPROCEDURE EVALUATION
Patient: Ashley Pack    Procedure Summary       Date: 05/17/24 Room / Location: [facility] ROOM 21 / SURGERY SAME DAY [facility]    Anesthesia Start: 1220 Anesthesia Stop: 1305    Procedures:       HYSTEROSCOPY, DILATION AND CURETTAGE WITH POLYPECTOMY. (Uterus)      DILATION AND CURETTAGE (Uterus) Diagnosis: (POSTMENOPAUSAL BLEEDING)    Surgeons: Russell Montenegro M.D. Responsible Provider: Shorty Silva M.D.    Anesthesia Type: general ASA Status: 2            Final Anesthesia Type: general  Last vitals  BP WNL       Temp WNL   Pulse WNL   Resp WNL   SpO2 WNL     Anesthesia Post Evaluation    Patient location during evaluation: PACU  Patient participation: complete - patient participated  Level of consciousness: awake and alert  Pain score: 4    Airway patency: patent  Anesthetic complications: no  Cardiovascular status: hemodynamically stable  Respiratory status: acceptable  Hydration status: euvolemic    PONV: none          There were no known notable events for this encounter.     Nurse Pain Score: 4 (NPRS)

## 2024-05-30 ENCOUNTER — APPOINTMENT (OUTPATIENT)
Dept: OBGYN | Facility: CLINIC | Age: 54
End: 2024-05-30
Payer: COMMERCIAL

## 2024-05-30 ENCOUNTER — HOSPITAL ENCOUNTER (OUTPATIENT)
Dept: RADIOLOGY | Facility: MEDICAL CENTER | Age: 54
End: 2024-05-30
Attending: OBSTETRICS & GYNECOLOGY
Payer: COMMERCIAL

## 2024-05-30 ENCOUNTER — GYNECOLOGY VISIT (OUTPATIENT)
Dept: OBGYN | Facility: CLINIC | Age: 54
End: 2024-05-30
Payer: COMMERCIAL

## 2024-05-30 VITALS — WEIGHT: 134 LBS | BODY MASS INDEX: 26.17 KG/M2 | DIASTOLIC BLOOD PRESSURE: 86 MMHG | SYSTOLIC BLOOD PRESSURE: 116 MMHG

## 2024-05-30 DIAGNOSIS — R23.2 HOT FLASHES: ICD-10-CM

## 2024-05-30 DIAGNOSIS — Z12.39 ENCOUNTER FOR SCREENING FOR MALIGNANT NEOPLASM OF BREAST, UNSPECIFIED SCREENING MODALITY: ICD-10-CM

## 2024-05-30 DIAGNOSIS — N85.01 SIMPLE ENDOMETRIAL HYPERPLASIA: ICD-10-CM

## 2024-05-30 DIAGNOSIS — Z09 POSTOP CHECK: ICD-10-CM

## 2024-05-30 ASSESSMENT — FIBROSIS 4 INDEX: FIB4 SCORE: 0.85

## 2024-05-30 NOTE — PROGRESS NOTES
Patient here for GYN visit./ Post-Op Hysteroscopy, D&C , and Polypectomy   SX was on 5/17/24  Last seen on : 5/16/24  Pt states she would like to discuss resuming hormones. Has been off of hormones since April. Has been experiencing side effects.   Phone/Pharmacy verified

## 2024-05-30 NOTE — PROGRESS NOTES
CC: Post-operative check    Date of Surgery: 2024    HPI: Patient is a 53 y.o. year old  who presents for follow up after a D&C hysteroscopy with polypectomy. Pathology report showed a polyp with simple hyperplasia without atypia. We discussed this finding and discussed that the most important thing she has done is discontinue the unopposed estrogen. We discussed that medical management with progesterone is typical and we discussed oral progesterone verses the Mirena IUD. She would like the Mirena IUD so she was scheduled for placement. We discussed that we would rebiopsy her about 3 months after placement to ensure that the hyperplasia is not progressing.     She is suffering from hot flashes and I discussed with her that estrogen is contraindicated due to the hyperplasia. I discussed Veozah with her as an option and she would like to try this. Most recent liver enzymes normal and she has not contraindication. I did discuss with her that this is a newer medication and I was not aware of a contraindication with phentermine, but that she should call the prescriber and ask. She is hopeful that if she has hot flash relief and is sleeping better she will no longer need the phentermine and can discontinue.     ROS:   General: Fever: no  GI: Abdominal pain: no  : Vaginal bleeding: no, resolved    PFSH:  I personally reviewed the past medical and surgical histories.     PHYSICAL EXAMINATION:  Vital Signs:   Vitals:    24 1326   BP: 116/86   BP Location: Right arm   Patient Position: Sitting   BP Cuff Size: Adult   Weight: 134 lb     Appearance/Psychiatric: in no apparent distress and well developed and well nourished  Heart: She does not have edema.  Lungs:Respiratory effort is normal.  Abd: soft, nontender, no rebound or guarding  Pelvic: deferred    ASSESSMENT AND PLAN:  53 y.o.    Ashley was seen today for gynecologic exam.    Diagnoses and all orders for this visit:    Postop check    Simple  endometrial hyperplasia    Hot flashes  -     Fezolinetant 45 MG Tab; Take 1 Tablet by mouth every day.    Plan:  Will plan on Mirena IUD placement 6/14/2024. She was given the brochure with information on this and due to a history of cervical stenosis she was given a sample of 200mcg cytotec to take the night before the procedure.   We discussed that we will perform an EMB 3 months after placement of the Mirena IUD  We discussed non-hormonal options for management of hot flashes. She was prescribed Veozah and given 2 weeks of samples    Return to clinic in 2 weeks for Mirena IUD placement     Russell Montenegro M.D.  5/30/2024

## 2024-06-07 ENCOUNTER — TELEPHONE (OUTPATIENT)
Dept: OBGYN | Facility: CLINIC | Age: 54
End: 2024-06-07
Payer: COMMERCIAL

## 2024-06-07 NOTE — TELEPHONE ENCOUNTER
DOCUMENTATION OF PAR STATUS:  *PA completed from [facility]*    1. Name of Medication & Dose:    Fezolinetant 45 MG Tab     2. Name of Prescription Coverage Company & phone #: [employer] PA Form      3. Date Prior Auth Submitted: 6/7/24    4. What information was given to obtain insurance decision: General    5. Prior Auth Status: DENIED    6. Provider Notified: yes    Patient will need to have 2 failures of the preferred alternatives before fezolinetant (VEOZAH) approval. Pt has only 1 failure of Vivelle Dot Patch.    Please send in a new Rx for one of these medications-Thank you.  Available Formulary Alternatives: estradiol, estradiol-norethindrone, CLIMARA PRO, COMBIPATCH, DIVIGEL, DUAVEE, PREMPHASE, PREMPRO     Covermymeds  KATE RAMIREZ ([place]: [id number])  PA Case ID #: [id number]  Rx #: [id number]

## 2024-06-14 ENCOUNTER — GYNECOLOGY VISIT (OUTPATIENT)
Dept: OBGYN | Facility: CLINIC | Age: 54
End: 2024-06-14
Payer: COMMERCIAL

## 2024-06-14 VITALS
DIASTOLIC BLOOD PRESSURE: 75 MMHG | HEIGHT: 60 IN | WEIGHT: 137.3 LBS | SYSTOLIC BLOOD PRESSURE: 114 MMHG | BODY MASS INDEX: 26.96 KG/M2

## 2024-06-14 DIAGNOSIS — Z30.430 ENCOUNTER FOR IUD INSERTION: ICD-10-CM

## 2024-06-14 DIAGNOSIS — N85.00 ENDOMETRIAL HYPERPLASIA WITHOUT ATYPIA: ICD-10-CM

## 2024-06-14 DIAGNOSIS — N95.1 HOT FLASHES DUE TO MENOPAUSE: ICD-10-CM

## 2024-06-14 LAB
POCT INT CON NEG: NEGATIVE
POCT INT CON POS: POSITIVE
POCT URINE PREGNANCY TEST: NEGATIVE

## 2024-06-14 PROCEDURE — 3078F DIAST BP <80 MM HG: CPT | Performed by: OBSTETRICS & GYNECOLOGY

## 2024-06-14 PROCEDURE — 3074F SYST BP LT 130 MM HG: CPT | Performed by: OBSTETRICS & GYNECOLOGY

## 2024-06-14 PROCEDURE — 99214 OFFICE O/P EST MOD 30 MIN: CPT | Mod: 25 | Performed by: OBSTETRICS & GYNECOLOGY

## 2024-06-14 PROCEDURE — 81025 URINE PREGNANCY TEST: CPT | Performed by: OBSTETRICS & GYNECOLOGY

## 2024-06-14 PROCEDURE — 58300 INSERT INTRAUTERINE DEVICE: CPT | Performed by: OBSTETRICS & GYNECOLOGY

## 2024-06-14 RX ORDER — GABAPENTIN 100 MG/1
100 CAPSULE ORAL NIGHTLY
Qty: 30 CAPSULE | Refills: 2 | Status: SHIPPED | OUTPATIENT
Start: 2024-06-14

## 2024-06-14 ASSESSMENT — ENCOUNTER SYMPTOMS
CARDIOVASCULAR NEGATIVE: 1
GASTROINTESTINAL NEGATIVE: 1
EYES NEGATIVE: 1
PSYCHIATRIC NEGATIVE: 1
MUSCULOSKELETAL NEGATIVE: 1
RESPIRATORY NEGATIVE: 1
NEUROLOGICAL NEGATIVE: 1

## 2024-06-14 ASSESSMENT — FIBROSIS 4 INDEX: FIB4 SCORE: 0.85

## 2024-06-14 NOTE — PROCEDURES
IUD Insertion    Date/Time: 6/14/2024 4:07 PM    Performed by: Russell Montenegro M.D.  Authorized by: Russell Montenegro M.D.    Consent:     Consent obtained:  Written    Consent given by:  Patient    Procedure risks and benefits discussed: yes      Patient questions answered: yes      Patient agrees, verbalizes understanding, and wants to proceed: yes      Educational handouts given: yes      Instructions and paperwork completed: yes    Pre-procedure details:     Negative urine pregnancy test: yes    Procedure:     Pelvic exam performed: yes      Sterile speculum placed in vagina: yes      Cervix visualized: yes      Cervix cleaned and prepped in sterile fashion: yes      Tenaculum applied to cervix: yes      Dilation needed: no      Uterus sounded: yes      Uterus sound depth (cm):  7    IUD inserted with no complications: yes      IUD type:  Mirena    Strings trimmed: yes    Post-procedure:     Patient tolerated procedure well: yes      Patient will follow up after next period: yes        Russell Montenegro M.D.

## 2024-06-14 NOTE — PROGRESS NOTES
GYN visit for Mirena IUD Insertion. Consent signed  LMP: 2023  WT: 137.3 lb  BP: 114/75  Good # [phone]  Last pap:  04/23/2024 ASC-US, Negative HPV    Pt states had BTL in 2002    Negative UPT today, done in clinic

## 2024-06-14 NOTE — LETTER
June 14, 2024        Ashley Pack  [id number] [place] [id number]        To Whom it may concern:  Ashley Pack was seen in our office today 06/14/2024. Please excuse her absence on 06/14/2024      If you have any questions or concerns, please don't hesitate to call.        Sincerely,        Russell Montenegro M.D.    Electronically Signed

## 2024-06-14 NOTE — PROGRESS NOTES
GYN PROBLEM VISIT    CC:  No chief complaint on file.    HPI: Patient is a 53 y.o.  who presents for Mirena IUD placement. Today the patient is counseled on procedure of IUD insertion. I discussed with the patient the risks of IUD including infection, risk of IUD expulsion, the risk of uterine perforation (1-1000, slightly higher while breastfeeding), risk of IUD migration or lost strings.  If the IUD does migrate the patient may require a separate procedure such as hysteroscopy or laparoscopy to remove or retrieve the migrated IUD. I also discussed the 0.1% risk of pregnancy with IUD use which coincides with an increased risk of ectopic pregnancy with IUD use.  We reviewed leaving the strings long enough to prevent disappearance however this may initially result in the possibility that partner can feel the IUD during intercourse; this typically resolves as the strings soften and tuck behind cervix. I also discussed the side effects of progestin-containing IUDs including decreased, irregular or absent menses and/or spotting.  All questions answered.  Informed consent is signed.  UPT negative.    In addition to the placement of the Mirena IUD, she would like to talk about her hot flashes. Ever since she stopped the estrogen, she has been having hot flashes that have disturbed her sleep. She was given a sample of Veozah, but experienced uncomfortable bloating and discontinued due to this. She would like to try a different medication. She was counseled that the other two options are Paroxetine and Gabapentin. After discussing each, she would like to try a low dose of nightly Gabapentin. This was sent to her pharmacy. She was counseled that we could increase the dose if it is not effective. She is going to return to clinic in September for an endometrial biopsy to ensure her hyperplasia has resolved.      ROS:   Review of Systems   Constitutional:         Hot flashes   HENT: Negative.     Eyes: Negative.     Respiratory: Negative.     Cardiovascular: Negative.    Gastrointestinal: Negative.    Genitourinary: Negative.    Musculoskeletal: Negative.    Skin: Negative.    Neurological: Negative.    Endo/Heme/Allergies: Negative.    Psychiatric/Behavioral: Negative.           PFSH:  I personally reviewed the past medical and surgical histories.     Social History     Tobacco Use    Smoking status: Former     Current packs/day: 0.00     Types: Cigarettes     Quit date: 1993     Years since quittin.4     Passive exposure: Never    Smokeless tobacco: Never    Tobacco comments:     Occasionally   Vaping Use    Vaping status: Never Used   Substance Use Topics    Alcohol use: Yes     Alcohol/week: 3.0 oz     Types: 5 Cans of beer per week     Comment: Socially    Drug use: Never       Social History     Substance and Sexual Activity   Sexual Activity Yes    Partners: Male    Birth control/protection: None        ALLERGIES / REACTIONS:  No Known Allergies                        PHYSICAL EXAMINATION:  Vital Signs:   Vitals:    24 1459   BP: 114/75   BP Location: Right arm   Patient Position: Sitting   BP Cuff Size: Large adult   Weight: 137 lb 4.8 oz   Height: 5'     Body mass index is 26.81 kg/m².    Physical Exam  Vitals reviewed. Exam conducted with a chaperone present.   Constitutional:       Appearance: Normal appearance.   HENT:      Head: Normocephalic.   Eyes:      Extraocular Movements: Extraocular movements intact.      Pupils: Pupils are equal, round, and reactive to light.   Cardiovascular:      Rate and Rhythm: Normal rate.   Pulmonary:      Effort: Pulmonary effort is normal.   Abdominal:      General: Abdomen is flat.      Palpations: Abdomen is soft.   Genitourinary:     General: Normal vulva.      Exam position: Lithotomy position.      Vagina: Normal.      Cervix: Normal.      Uterus: Normal.       Comments: Uterus sounds to 7cm. Mirena IUD was placed without complication   Musculoskeletal:          General: Normal range of motion.      Cervical back: Normal range of motion.   Skin:     General: Skin is warm and dry.   Neurological:      General: No focal deficit present.      Mental Status: She is alert and oriented to person, place, and time.   Psychiatric:         Mood and Affect: Mood normal.         Behavior: Behavior normal.          ASSESSMENT AND PLAN:  53 y.o.    1. Encounter for IUD insertion  - Consent for all Surgical, Special Diagnostic or Therapeutic Procedures  - POCT Pregnancy  - levonorgestrel (Mirena) 20 MCG/DAY IUD 1 Each    2. Hot flashes due to menopause  - gabapentin (NEURONTIN) 100 MG Cap; Take 1 Capsule by mouth every evening.  Dispense: 30 Capsule; Refill: 2    3. Endometrial hyperplasia without atypia    Plan:  IUD was placed without complication. See separate procedure note  She experienced bloating on Veozah and requested a different pharmaceutical option for hot flashes. She was counseled on non-hormonal options and opted for a trial of Gabapentin which was sent to her pharmacy  She is to return to clinic in about 3 months for resampling of her endometrium. She had received about 7 months of unopposed estrogen which is likely why she developed the hyperplasia in the first place.     At least 35 minutes were spent on chart review, counseling the patient, and coordinating follow up care in addition to performing the procedure today    Russell Montenegro M.D.  Obstetrics & Gynecology   [phone]  3:59 PM

## 2024-08-05 ENCOUNTER — PATIENT MESSAGE (OUTPATIENT)
Dept: OBGYN | Facility: CLINIC | Age: 54
End: 2024-08-05
Payer: COMMERCIAL

## 2024-08-05 DIAGNOSIS — N95.1 HOT FLASHES DUE TO MENOPAUSE: ICD-10-CM

## 2024-08-07 RX ORDER — GABAPENTIN 100 MG/1
100 CAPSULE ORAL NIGHTLY
Qty: 30 CAPSULE | Refills: 2 | Status: SHIPPED | OUTPATIENT
Start: 2024-08-07

## (undated) DEVICE — SET TUBING AQUILEX IN-FLOW MYOSURE (10EA/BX)

## (undated) DEVICE — SPECIMEN PLASTIC CONVERTOR - (10/CA)

## (undated) DEVICE — MASK OXYGEN VNYL ADLT MED CONC WITH 7 FOOT TUBING  - (50EA/CA)

## (undated) DEVICE — ELECTRODE DUAL RETURN W/ CORD - (50/PK)

## (undated) DEVICE — CANNULA O2 COMFORT SOFT EAR ADULT 7 FT TUBING (50/CA)

## (undated) DEVICE — GOWN WARMING STANDARD FLEX - (30/CA)

## (undated) DEVICE — SODIUM CHL. IRRIGATION 0.9% 3000ML (4EA/CA 65CA/PF)

## (undated) DEVICE — GLOVE BIOGEL SZ 6.5 SURGICAL PF LTX (50PR/BX 4BX/CA)

## (undated) DEVICE — LACTATED RINGERS INJ 1000 ML - (14EA/CA 60CA/PF)

## (undated) DEVICE — CANISTER SUCTION RIGID RED 1500CC (40EA/CA)

## (undated) DEVICE — TUBING CLEARLINK DUO-VENT - C-FLO (48EA/CA)

## (undated) DEVICE — KIT  I.V. START (100EA/CA)

## (undated) DEVICE — MASK AIRWAY FLEXIBLE SINGLE-USE SIZE 4 ADULTS (10EA/BX)

## (undated) DEVICE — SET LEADWIRE 5 LEAD BEDSIDE DISPOSABLE ECG (1SET OF 5/EA)

## (undated) DEVICE — Device

## (undated) DEVICE — SLEEVE VASO CALF MED - (10PR/CA)

## (undated) DEVICE — WATER IRRIGATION STERILE 1000ML (12EA/CA)

## (undated) DEVICE — SUCTION INSTRUMENT YANKAUER BULBOUS TIP W/O VENT (50EA/CA)

## (undated) DEVICE — SET TUBING AQUILEX OUT-FLOW MYOSURE (10EA/BX)

## (undated) DEVICE — SENSOR OXIMETER ADULT SPO2 RD SET (20EA/BX)

## (undated) DEVICE — TOWEL STOP TIMEOUT SAFETY FLAG (40EA/CA)

## (undated) DEVICE — TUBE CONNECTING SUCTION - CLEAR PLASTIC STERILE 72 IN (50EA/CA)

## (undated) DEVICE — SODIUM CHL IRRIGATION 0.9% 1000ML (12EA/CA)

## (undated) DEVICE — CANISTER SUCTION 3000ML MECHANICAL FILTER AUTO SHUTOFF MEDI-VAC NONSTERILE LF DISP  (40EA/CA)

## (undated) DEVICE — DEVICE REMOVAL MYOSURE TISSUE (3EA/BX)

## (undated) DEVICE — PAD SANITARY 11IN MAXI IND WRAPPED  (12EA/PK 24PK/CA)